# Patient Record
Sex: MALE | Race: WHITE | NOT HISPANIC OR LATINO
[De-identification: names, ages, dates, MRNs, and addresses within clinical notes are randomized per-mention and may not be internally consistent; named-entity substitution may affect disease eponyms.]

---

## 2019-10-08 PROBLEM — Z00.00 ENCOUNTER FOR PREVENTIVE HEALTH EXAMINATION: Status: ACTIVE | Noted: 2019-10-08

## 2019-11-07 ENCOUNTER — APPOINTMENT (OUTPATIENT)
Dept: SURGERY | Facility: CLINIC | Age: 54
End: 2019-11-07

## 2020-03-03 ENCOUNTER — APPOINTMENT (OUTPATIENT)
Dept: SURGERY | Facility: CLINIC | Age: 55
End: 2020-03-03

## 2020-06-18 ENCOUNTER — APPOINTMENT (OUTPATIENT)
Dept: SURGERY | Facility: CLINIC | Age: 55
End: 2020-06-18
Payer: COMMERCIAL

## 2020-06-18 VITALS — WEIGHT: 214 LBS | BODY MASS INDEX: 32.43 KG/M2 | HEIGHT: 68 IN

## 2020-06-18 PROCEDURE — 99243 OFF/OP CNSLTJ NEW/EST LOW 30: CPT

## 2020-06-18 NOTE — ASSESSMENT
[FreeTextEntry1] : Jt is a pleasant 55-year-old  presenting to the office with his wife with a past medical history significant for hypertension, hypercholesterolemia, GERD and who recently stopped drinking alcohol 3-1/2 weeks ago now complaining of pain and swelling in the mid abdomen suspicious for hernia.\par \par Physical examination demonstrates a large plum-sized bulge several fingerbreadths above the umbilicus which is tender to palpation and not reducible consistent with a large symptomatic incarcerated ventral hernia warranting surgical repair. This is complicated by a mild to moderate diastasis recti likely related to his excess abdominal weight. His current BMI is 33.\par \par I explained the pros and cons of surgery, as well as all risks, benefits, indications and alternatives of the procedure and the patient and his wife understood and agreed. Jt was scheduled for the repair of his incarcerated ventral hernia with mesh on Friday, July 24, 2020 under LOCAL with IV SEDATION at [facility] with presurgical testing waived.  He was encouraged to avoid heavy lifting and strenuous activity in the interim, of course.

## 2020-06-18 NOTE — CONSULT LETTER
[Dear  ___] : Dear  [unfilled], [Courtesy Letter:] : I had the pleasure of seeing your patient, [unfilled], in my office today. [Please see my note below.] : Please see my note below. [Consult Closing:] : Thank you very much for allowing me to participate in the care of this patient.  If you have any questions, please do not hesitate to contact me. [FreeTextEntry3] : Respectfully,\par \par Manuel Padilla M.D., FACS\par

## 2020-06-18 NOTE — PHYSICAL EXAM
[No Rash or Lesion] : No rash or lesion [Normal Rate and Rhythm] : normal rate and rhythm [Normal Breath Sounds] : Normal breath sounds [Calm] : calm [Alert] : alert [de-identified] : normal [JVD] : no jugular venous distention  [de-identified] : overweight [de-identified] : incarcerated ventral hernia [de-identified] : protuberant abdomen, moderate diastasis recti\par

## 2020-07-07 ENCOUNTER — OUTPATIENT (OUTPATIENT)
Dept: OUTPATIENT SERVICES | Facility: HOSPITAL | Age: 55
LOS: 1 days | Discharge: HOME | End: 2020-07-07

## 2020-07-07 ENCOUNTER — LABORATORY RESULT (OUTPATIENT)
Age: 55
End: 2020-07-07

## 2020-07-07 DIAGNOSIS — Z11.59 ENCOUNTER FOR SCREENING FOR OTHER VIRAL DISEASES: ICD-10-CM

## 2020-07-10 ENCOUNTER — APPOINTMENT (OUTPATIENT)
Dept: SURGERY | Facility: AMBULATORY SURGERY CENTER | Age: 55
End: 2020-07-10

## 2020-07-21 ENCOUNTER — APPOINTMENT (OUTPATIENT)
Dept: SURGERY | Facility: CLINIC | Age: 55
End: 2020-07-21

## 2020-08-07 ENCOUNTER — LABORATORY RESULT (OUTPATIENT)
Age: 55
End: 2020-08-07

## 2020-08-07 ENCOUNTER — OUTPATIENT (OUTPATIENT)
Dept: OUTPATIENT SERVICES | Facility: HOSPITAL | Age: 55
LOS: 1 days | Discharge: HOME | End: 2020-08-07

## 2020-08-07 DIAGNOSIS — Z11.59 ENCOUNTER FOR SCREENING FOR OTHER VIRAL DISEASES: ICD-10-CM

## 2020-08-07 NOTE — ASU PATIENT PROFILE, ADULT - NS TRANSFER EYEGLASSES PAIRS
----- Message from Marla Connell RN sent at 4/4/2019 11:50 AM CDT -----  GC/Chlam test results are negative. Will call pt to convey results.  
Contacted patient regarding clinic visit and culture results. Patient improved. Aware of results.   
Left message for pt to return phone call.    
1 pair

## 2020-08-10 ENCOUNTER — APPOINTMENT (OUTPATIENT)
Dept: SURGERY | Facility: AMBULATORY SURGERY CENTER | Age: 55
End: 2020-08-10
Payer: COMMERCIAL

## 2020-08-10 ENCOUNTER — OUTPATIENT (OUTPATIENT)
Dept: OUTPATIENT SERVICES | Facility: HOSPITAL | Age: 55
LOS: 1 days | Discharge: HOME | End: 2020-08-10

## 2020-08-10 VITALS
DIASTOLIC BLOOD PRESSURE: 81 MMHG | WEIGHT: 210.98 LBS | SYSTOLIC BLOOD PRESSURE: 129 MMHG | HEIGHT: 68 IN | RESPIRATION RATE: 20 BRPM | HEART RATE: 49 BPM | OXYGEN SATURATION: 98 % | TEMPERATURE: 98 F

## 2020-08-10 VITALS
HEART RATE: 55 BPM | OXYGEN SATURATION: 95 % | TEMPERATURE: 98 F | RESPIRATION RATE: 18 BRPM | SYSTOLIC BLOOD PRESSURE: 150 MMHG | DIASTOLIC BLOOD PRESSURE: 96 MMHG

## 2020-08-10 DIAGNOSIS — Z98.890 OTHER SPECIFIED POSTPROCEDURAL STATES: Chronic | ICD-10-CM

## 2020-08-10 PROCEDURE — 49561: CPT

## 2020-08-10 PROCEDURE — 49568: CPT

## 2020-08-10 PROCEDURE — 49585: CPT | Mod: XS

## 2020-08-10 RX ORDER — HYDROMORPHONE HYDROCHLORIDE 2 MG/ML
0.5 INJECTION INTRAMUSCULAR; INTRAVENOUS; SUBCUTANEOUS
Refills: 0 | Status: DISCONTINUED | OUTPATIENT
Start: 2020-08-10 | End: 2020-08-10

## 2020-08-10 RX ORDER — TRAMADOL HYDROCHLORIDE 50 MG/1
1 TABLET ORAL
Qty: 30 | Refills: 0
Start: 2020-08-10 | End: 2020-08-14

## 2020-08-10 RX ORDER — OXYCODONE AND ACETAMINOPHEN 5; 325 MG/1; MG/1
1 TABLET ORAL ONCE
Refills: 0 | Status: DISCONTINUED | OUTPATIENT
Start: 2020-08-10 | End: 2020-08-10

## 2020-08-10 RX ORDER — ONDANSETRON 8 MG/1
4 TABLET, FILM COATED ORAL ONCE
Refills: 0 | Status: DISCONTINUED | OUTPATIENT
Start: 2020-08-10 | End: 2020-08-25

## 2020-08-10 RX ORDER — SODIUM CHLORIDE 9 MG/ML
1000 INJECTION, SOLUTION INTRAVENOUS
Refills: 0 | Status: DISCONTINUED | OUTPATIENT
Start: 2020-08-10 | End: 2020-08-25

## 2020-08-10 RX ADMIN — OXYCODONE AND ACETAMINOPHEN 1 TABLET(S): 5; 325 TABLET ORAL at 10:30

## 2020-08-10 RX ADMIN — SODIUM CHLORIDE 100 MILLILITER(S): 9 INJECTION, SOLUTION INTRAVENOUS at 09:41

## 2020-08-10 NOTE — ASU DISCHARGE PLAN (ADULT/PEDIATRIC) - CARE PROVIDER_API CALL
Manuel Padilla  SURGERY  [address] [place] [address]  Phone: [phone]  Fax: [phone]  Scheduled Appointment: 08/18/2020

## 2020-08-10 NOTE — BRIEF OPERATIVE NOTE - NSICDXBRIEFPROCEDURE_GEN_ALL_CORE_FT
PROCEDURES:  Repair, hernia, umbilical, adult 10-Aug-2020 09:34:21  Manuel Padilla  Repair of incarcerated ventral hernia with mesh 10-Aug-2020 08:37:11  Manuel Padilla

## 2020-08-10 NOTE — BRIEF OPERATIVE NOTE - NSICDXBRIEFPOSTOP_GEN_ALL_CORE_FT
POST-OP DIAGNOSIS:  Umbilical hernia 10-Aug-2020 09:34:01  Manuel Padilla  Incarcerated ventral hernia 10-Aug-2020 08:37:04  Manuel Padilla

## 2020-08-10 NOTE — ASU PREOP CHECKLIST - VIA
ambulate I personally saw the patient with the PA, and completed the key components of the history and physical exam. I then discussed the management plan with the PA.   gen in nad resp clear cardiac no murmur abd soft neuro intact skin + tineal lesion as documented by pa no signs secondary infections  agree with pa plan of care

## 2020-08-10 NOTE — PRE-ANESTHESIA EVALUATION ADULT - NSANTHOSAYNRD_GEN_A_CORE
No. TARIQ screening performed.  STOP BANG Legend: 0-2 = LOW Risk; 3-4 = INTERMEDIATE Risk; 5-8 = HIGH Risk

## 2020-08-10 NOTE — CHART NOTE - NSCHARTNOTEFT_GEN_A_CORE
PACU ANESTHESIA ADMISSION NOTE      Procedure: Repair, hernia, umbilical, adult  Repair of incarcerated ventral hernia with mesh    Post op diagnosis:  Umbilical hernia  Incarcerated ventral hernia      ____  Intubated  TV:______       Rate: ______      FiO2: ______    _x___  Patent Airway    _x___  Full return of protective reflexes    _x___  Full recovery from anesthesia / back to baseline status    Vitals  SPO2:-99% on 3l nc  HR:-79  RR:-12  B.P:-133/83  TEMP:-97.7    Mental Status:  _x___ Awake   ___x_ Alert   _____ Drowsy   _____ Sedated    Nausea/Vomiting:  _x___  NO       ______Yes,   See Post - Op Orders         Pain Scale (0-10):  __0___    Treatment: _x___ None    __x__ See Post - Op/PCA Orders    Post - Operative Fluids:   ___ Oral   ____x See Post - Op Orders    Plan: Discharge:   __x__Home       _____Floor     _____Critical Care    _____  Other:_________________    Comments:  Report endorsed to RN in pacu  Vitals stable  No anesthesia issues or complications noted.  Discharge to patient to home when criteria met.

## 2020-08-13 DIAGNOSIS — F41.9 ANXIETY DISORDER, UNSPECIFIED: ICD-10-CM

## 2020-08-13 DIAGNOSIS — K43.6 OTHER AND UNSPECIFIED VENTRAL HERNIA WITH OBSTRUCTION, WITHOUT GANGRENE: ICD-10-CM

## 2020-08-13 DIAGNOSIS — I10 ESSENTIAL (PRIMARY) HYPERTENSION: ICD-10-CM

## 2020-08-13 DIAGNOSIS — F32.9 MAJOR DEPRESSIVE DISORDER, SINGLE EPISODE, UNSPECIFIED: ICD-10-CM

## 2020-08-13 DIAGNOSIS — K42.9 UMBILICAL HERNIA WITHOUT OBSTRUCTION OR GANGRENE: ICD-10-CM

## 2020-08-20 ENCOUNTER — APPOINTMENT (OUTPATIENT)
Dept: SURGERY | Facility: CLINIC | Age: 55
End: 2020-08-20
Payer: COMMERCIAL

## 2020-08-20 DIAGNOSIS — E66.09 OTHER OBESITY DUE TO EXCESS CALORIES: ICD-10-CM

## 2020-08-20 DIAGNOSIS — M62.08 SEPARATION OF MUSCLE (NONTRAUMATIC), OTHER SITE: ICD-10-CM

## 2020-08-20 DIAGNOSIS — K43.6 OTHER AND UNSPECIFIED VENTRAL HERNIA WITH OBSTRUCTION, W/OUT GANGRENE: ICD-10-CM

## 2020-08-20 PROCEDURE — 99024 POSTOP FOLLOW-UP VISIT: CPT

## 2020-08-20 NOTE — ASSESSMENT
[FreeTextEntry1] : Jt underwent the repair of his incarcerated ventral hernia with mesh and repair of his umbilical hernia on August 10, 2020 under local with IV sedation without any problems or complications. His wound is clean, dry and intact. There is no evidence of erythema, seroma formation or infection. He is tolerating a diet and having normal bowel movements. He denies any significant postoperative pain or discomfort at this time.\par \par He was counseled and reassured. Jt was discharged from the office with no specific followup necessary, but he knows to avoid any heavy lifting or strenuous activity for the next several weeks. We also discussed the importance of calorie restriction and healthy eating with regard to weight loss, hernia recurrence and his overall health. He was encouraged to continue to wear his abdominal binder for the better part of the next month, especially since he stopped wearing it on his own several days ago.

## 2020-08-20 NOTE — CONSULT LETTER
[FreeTextEntry1] : Dear Dr. Tamir Avalos, \par \par I had the pleasure of seeing your patient, ARTHUR FLORES, in my office today. Please see my note below. \par \par Thank you very much for allowing me to participate in the care of this patient. If you have any questions, please do not hesitate to contact me. \par \par \par Respectfully,\par \par Manuel Padilla M.D., FACS

## 2021-09-12 RX ADMIN — MORPHINE SULFATE 2 MILLIGRAM(S): 50 CAPSULE, EXTENDED RELEASE ORAL at 12:13

## 2021-09-13 ENCOUNTER — INPATIENT (INPATIENT)
Facility: HOSPITAL | Age: 56
LOS: 2 days | Discharge: HOME | End: 2021-09-16
Attending: HOSPITALIST | Admitting: HOSPITALIST
Payer: COMMERCIAL

## 2021-09-13 VITALS
DIASTOLIC BLOOD PRESSURE: 88 MMHG | HEART RATE: 65 BPM | OXYGEN SATURATION: 95 % | SYSTOLIC BLOOD PRESSURE: 145 MMHG | RESPIRATION RATE: 20 BRPM | HEIGHT: 68 IN | TEMPERATURE: 97 F | WEIGHT: 212.08 LBS

## 2021-09-13 DIAGNOSIS — E78.5 HYPERLIPIDEMIA, UNSPECIFIED: ICD-10-CM

## 2021-09-13 DIAGNOSIS — Z98.890 OTHER SPECIFIED POSTPROCEDURAL STATES: Chronic | ICD-10-CM

## 2021-09-13 DIAGNOSIS — K21.9 GASTRO-ESOPHAGEAL REFLUX DISEASE WITHOUT ESOPHAGITIS: ICD-10-CM

## 2021-09-13 DIAGNOSIS — K85.90 ACUTE PANCREATITIS WITHOUT NECROSIS OR INFECTION, UNSPECIFIED: ICD-10-CM

## 2021-09-13 DIAGNOSIS — I10 ESSENTIAL (PRIMARY) HYPERTENSION: ICD-10-CM

## 2021-09-13 PROBLEM — F10.20 ALCOHOL DEPENDENCE, UNCOMPLICATED: Chronic | Status: ACTIVE | Noted: 2020-08-10

## 2021-09-13 LAB
ALBUMIN SERPL ELPH-MCNC: 4.7 G/DL — SIGNIFICANT CHANGE UP (ref 3.5–5.2)
ALP SERPL-CCNC: 109 U/L — SIGNIFICANT CHANGE UP (ref 30–115)
ALT FLD-CCNC: 30 U/L — SIGNIFICANT CHANGE UP (ref 0–41)
ANION GAP SERPL CALC-SCNC: 12 MMOL/L — SIGNIFICANT CHANGE UP (ref 7–14)
AST SERPL-CCNC: 27 U/L — SIGNIFICANT CHANGE UP (ref 0–41)
BILIRUB SERPL-MCNC: 1.6 MG/DL — HIGH (ref 0.2–1.2)
BUN SERPL-MCNC: 18 MG/DL — SIGNIFICANT CHANGE UP (ref 10–20)
CALCIUM SERPL-MCNC: 9.8 MG/DL — SIGNIFICANT CHANGE UP (ref 8.5–10.1)
CHLORIDE SERPL-SCNC: 102 MMOL/L — SIGNIFICANT CHANGE UP (ref 98–110)
CO2 SERPL-SCNC: 24 MMOL/L — SIGNIFICANT CHANGE UP (ref 17–32)
CREAT SERPL-MCNC: 1.2 MG/DL — SIGNIFICANT CHANGE UP (ref 0.7–1.5)
GLUCOSE SERPL-MCNC: 112 MG/DL — HIGH (ref 70–99)
HCT VFR BLD CALC: 43.4 % — SIGNIFICANT CHANGE UP (ref 42–52)
HGB BLD-MCNC: 14.9 G/DL — SIGNIFICANT CHANGE UP (ref 14–18)
LIDOCAIN IGE QN: 141 U/L — HIGH (ref 7–60)
MCHC RBC-ENTMCNC: 30.4 PG — SIGNIFICANT CHANGE UP (ref 27–31)
MCHC RBC-ENTMCNC: 34.3 G/DL — SIGNIFICANT CHANGE UP (ref 32–37)
MCV RBC AUTO: 88.6 FL — SIGNIFICANT CHANGE UP (ref 80–94)
NRBC # BLD: 0 /100 WBCS — SIGNIFICANT CHANGE UP (ref 0–0)
PLATELET # BLD AUTO: 186 K/UL — SIGNIFICANT CHANGE UP (ref 130–400)
POTASSIUM SERPL-MCNC: 4.2 MMOL/L — SIGNIFICANT CHANGE UP (ref 3.5–5)
POTASSIUM SERPL-SCNC: 4.2 MMOL/L — SIGNIFICANT CHANGE UP (ref 3.5–5)
PROT SERPL-MCNC: 6.7 G/DL — SIGNIFICANT CHANGE UP (ref 6–8)
RBC # BLD: 4.9 M/UL — SIGNIFICANT CHANGE UP (ref 4.7–6.1)
RBC # FLD: 12.5 % — SIGNIFICANT CHANGE UP (ref 11.5–14.5)
SARS-COV-2 RNA SPEC QL NAA+PROBE: SIGNIFICANT CHANGE UP
SODIUM SERPL-SCNC: 138 MMOL/L — SIGNIFICANT CHANGE UP (ref 135–146)
WBC # BLD: 10.23 K/UL — SIGNIFICANT CHANGE UP (ref 4.8–10.8)
WBC # FLD AUTO: 10.23 K/UL — SIGNIFICANT CHANGE UP (ref 4.8–10.8)

## 2021-09-13 PROCEDURE — 93010 ELECTROCARDIOGRAM REPORT: CPT

## 2021-09-13 PROCEDURE — 74177 CT ABD & PELVIS W/CONTRAST: CPT | Mod: 26,MA

## 2021-09-13 PROCEDURE — 99285 EMERGENCY DEPT VISIT HI MDM: CPT

## 2021-09-13 PROCEDURE — 76705 ECHO EXAM OF ABDOMEN: CPT | Mod: 26

## 2021-09-13 PROCEDURE — 71045 X-RAY EXAM CHEST 1 VIEW: CPT | Mod: 26

## 2021-09-13 RX ORDER — OMEGA-3 ACID ETHYL ESTERS 1 G
0 CAPSULE ORAL
Qty: 0 | Refills: 0 | DISCHARGE

## 2021-09-13 RX ORDER — DEXLANSOPRAZOLE 30 MG/1
1 CAPSULE, DELAYED RELEASE ORAL
Qty: 0 | Refills: 0 | DISCHARGE

## 2021-09-13 RX ORDER — MORPHINE SULFATE 50 MG/1
4 CAPSULE, EXTENDED RELEASE ORAL EVERY 4 HOURS
Refills: 0 | Status: DISCONTINUED | OUTPATIENT
Start: 2021-09-13 | End: 2021-09-13

## 2021-09-13 RX ORDER — PANTOPRAZOLE SODIUM 20 MG/1
40 TABLET, DELAYED RELEASE ORAL DAILY
Refills: 0 | Status: DISCONTINUED | OUTPATIENT
Start: 2021-09-13 | End: 2021-09-15

## 2021-09-13 RX ORDER — ATORVASTATIN CALCIUM 80 MG/1
40 TABLET, FILM COATED ORAL DAILY
Refills: 0 | Status: DISCONTINUED | OUTPATIENT
Start: 2021-09-14 | End: 2021-09-16

## 2021-09-13 RX ORDER — MORPHINE SULFATE 50 MG/1
4 CAPSULE, EXTENDED RELEASE ORAL ONCE
Refills: 0 | Status: DISCONTINUED | OUTPATIENT
Start: 2021-09-13 | End: 2021-09-13

## 2021-09-13 RX ORDER — FAMOTIDINE 10 MG/ML
20 INJECTION INTRAVENOUS ONCE
Refills: 0 | Status: COMPLETED | OUTPATIENT
Start: 2021-09-13 | End: 2021-09-13

## 2021-09-13 RX ORDER — MORPHINE SULFATE 50 MG/1
2 CAPSULE, EXTENDED RELEASE ORAL EVERY 4 HOURS
Refills: 0 | Status: DISCONTINUED | OUTPATIENT
Start: 2021-09-13 | End: 2021-09-15

## 2021-09-13 RX ORDER — ACETAMINOPHEN 500 MG
650 TABLET ORAL EVERY 8 HOURS
Refills: 0 | Status: DISCONTINUED | OUTPATIENT
Start: 2021-09-13 | End: 2021-09-16

## 2021-09-13 RX ORDER — ONDANSETRON 8 MG/1
4 TABLET, FILM COATED ORAL EVERY 4 HOURS
Refills: 0 | Status: DISCONTINUED | OUTPATIENT
Start: 2021-09-13 | End: 2021-09-16

## 2021-09-13 RX ORDER — CHLORHEXIDINE GLUCONATE 213 G/1000ML
1 SOLUTION TOPICAL
Refills: 0 | Status: DISCONTINUED | OUTPATIENT
Start: 2021-09-13 | End: 2021-09-16

## 2021-09-13 RX ORDER — SODIUM CHLORIDE 9 MG/ML
1000 INJECTION INTRAMUSCULAR; INTRAVENOUS; SUBCUTANEOUS
Refills: 0 | Status: DISCONTINUED | OUTPATIENT
Start: 2021-09-13 | End: 2021-09-15

## 2021-09-13 RX ORDER — ENOXAPARIN SODIUM 100 MG/ML
40 INJECTION SUBCUTANEOUS DAILY
Refills: 0 | Status: DISCONTINUED | OUTPATIENT
Start: 2021-09-13 | End: 2021-09-16

## 2021-09-13 RX ORDER — ROSUVASTATIN CALCIUM 5 MG/1
1 TABLET ORAL
Qty: 0 | Refills: 0 | DISCHARGE

## 2021-09-13 RX ORDER — SODIUM CHLORIDE 9 MG/ML
3000 INJECTION, SOLUTION INTRAVENOUS
Refills: 0 | Status: DISCONTINUED | OUTPATIENT
Start: 2021-09-13 | End: 2021-09-13

## 2021-09-13 RX ADMIN — PANTOPRAZOLE SODIUM 40 MILLIGRAM(S): 20 TABLET, DELAYED RELEASE ORAL at 18:19

## 2021-09-13 RX ADMIN — ONDANSETRON 4 MILLIGRAM(S): 8 TABLET, FILM COATED ORAL at 17:02

## 2021-09-13 RX ADMIN — Medication 30 MILLILITER(S): at 08:47

## 2021-09-13 RX ADMIN — Medication 650 MILLIGRAM(S): at 17:53

## 2021-09-13 RX ADMIN — SODIUM CHLORIDE 200 MILLILITER(S): 9 INJECTION INTRAMUSCULAR; INTRAVENOUS; SUBCUTANEOUS at 21:35

## 2021-09-13 RX ADMIN — MORPHINE SULFATE 4 MILLIGRAM(S): 50 CAPSULE, EXTENDED RELEASE ORAL at 12:26

## 2021-09-13 RX ADMIN — SODIUM CHLORIDE 1000 MILLILITER(S): 9 INJECTION, SOLUTION INTRAVENOUS at 12:26

## 2021-09-13 RX ADMIN — MORPHINE SULFATE 4 MILLIGRAM(S): 50 CAPSULE, EXTENDED RELEASE ORAL at 14:52

## 2021-09-13 RX ADMIN — Medication 650 MILLIGRAM(S): at 18:19

## 2021-09-13 RX ADMIN — FAMOTIDINE 20 MILLIGRAM(S): 10 INJECTION INTRAVENOUS at 08:47

## 2021-09-13 NOTE — H&P ADULT - ATTENDING COMMENTS
Vital Signs Last 24 Hrs  T(C): 36.8 (13 Sep 2021 15:44), Max: 36.8 (13 Sep 2021 15:44)  T(F): 98.2 (13 Sep 2021 15:44), Max: 98.2 (13 Sep 2021 15:44)  HR: 57 (13 Sep 2021 15:44) (55 - 65)  BP: 178/97 (13 Sep 2021 15:44) (145/88 - 178/97)  BP(mean): --  RR: 18 (13 Sep 2021 15:44) (18 - 20)  SpO2: 97% (13 Sep 2021 14:43) (95% - 97%)  Pe  nad  aaox3  [id number]  ctabl  sofnd+bs mild epigastric tenderness - no rebound/guarding  nocce  obese    #acute pancreatitis - first episode  no gallstones  pt states he used to be a heavy binge drinker over a year ago  2 days prior to presentation had 2 beers at home  possibly ETOH induced pancreatitis   check triglyceride level  possibly medication induced - on ACEI and statin  will keep for now  IVFS  pain control   npo   monitor for clinical decompnesation - if vitals unstable call pulmonary /critical care    discussed with pt and PA

## 2021-09-13 NOTE — ED PROVIDER NOTE - NS ED ROS FT
Review of Systems:  •	CONSTITUTIONAL - No fever, No diaphoresis, No weight change  •	SKIN - No rash  •	HEMATOLOGIC - No abnormal bleeding or bruising  •	EYES - No eye pain, No blurred vision  •	ENT - No change in hearing, No sore throat, No neck pain, No rhinorrhea, No ear pain  •	RESPIRATORY - No shortness of breath, No cough  •	CARDIAC -No chest pain, No palpitations  •	GI - + abdominal pain, No nausea, No vomiting, No diarrhea, No constipation, No bright red blood per rectum or melena. No flank pain  •                 - No dysuria, frequency, hematuria.   •	ENDO - No polydypsia, No polyuria, No heat/cold intolerance  •	MUSCULOSKELETAL - No joint paint, No swelling, No back pain  •	NEUROLOGIC - No numbness, No focal weakness, No headache, No dizziness  All other systems negative, unless specified in HPI

## 2021-09-13 NOTE — ED PROVIDER NOTE - CLINICAL SUMMARY MEDICAL DECISION MAKING FREE TEXT BOX
56 yr old m that presents with abd pain   -labs  -ivf  -pain management  -imaging concerning for pancreatitis. will admit to medicine for further evaluation.

## 2021-09-13 NOTE — H&P ADULT - HISTORY OF PRESENT ILLNESS
The patient is a 55 y/o male with PMH of hld, htn, acid reflux, alcoholism, and erosive gastritis, presenting with epigastric abdominal pain for 3 days. He state the pain began while eating at a  BB[employer] and drinking 2 beers. Patient describes the pain as being located in epigastric region, sharp, constant, and without radiation. Denies  aggravating factors. He states he has never had pancreatitis before. Patient states he used to be a binge drinker, drinking until blackout and cut down 1 year ago to drinking only 2-3 drinks few times a month. Patient follows with his GI doctor in [facility]. His last endoscopy was 1 year ago and was diagnosed with erosive gastritis. Denies fever, chills, n/v, diarrhea. States he had a formed bm last night.

## 2021-09-13 NOTE — ED PROVIDER NOTE - ATTENDING CONTRIBUTION TO CARE
56 yr old m w/ a pmh significant for gastritis, s/p hernia repair who presents for abd pain. Pt states that for the past couple of days he has been having diffuse abd pain. Pt denies any chest pain, sob, nausea, vomiting, diarrhea or any other complaints.     VITAL SIGNS: I have reviewed nursing notes and confirm.  CONSTITUTIONAL: non-toxic, well appearing  SKIN: no rash, no petechiae.  EYES: EOMI, pink conjunctiva, anicteric  ENT: tongue midline, no exudates, MMM  NECK: Supple; no meningismus, no JVD  CARD: RRR, no murmurs, equal radial pulses bilaterally 2+  RESP: CTAB, no respiratory distress  ABD: Soft, diffuse tenderness, non-distended, no peritoneal signs, no HSM, no CVA tenderness  EXT: Normal ROM x4. No edema. No calves tenderness  NEURO: Alert, oriented. CN2-12 intact, equal strength bilaterally, nl gait.  PSYCH: Cooperative, appropriate.    a/p  56 yr old m that presents with abd pain   -labs  -ivf  -pain management  -imaging  -reassess  -dispo pending

## 2021-09-13 NOTE — ED PROVIDER NOTE - OBJECTIVE STATEMENT
Patient is a 55 yo male with PMHx of GERD, gastritis, HLD, HTN, alcohol abuse c/o abdominal pain x 3 days. Patient was at [facility] and started with gradual onset of sharp, constant, moderate to severe, epigastric abdominal pain. Never had this before. Denies fever, chills, chest pain, SOB, cough, n/v/d. Used to drink more alcohol before, but had cut down over past year. Last endoscopy 1 year ago, saw gastritis.

## 2021-09-13 NOTE — H&P ADULT - NSHPSOCIALHISTORY_GEN_ALL_CORE
tobacco: quit 20 years ago. 1 pack a day for 6 years.   alcohol: previously a binge drinker, drinking until blackout. cut down 1 year ago and now drinks occasionally (few drinks, 2-3x month)  drugs: none

## 2021-09-13 NOTE — ED PROVIDER NOTE - NS ED MD DISPO SPECIAL CONSIDERATION1
What Type Of Note Output Would You Prefer (Optional)?: Standard Output Hpi Title: Evaluation of Skin Lesions Have Your Spot(S) Been Treated In The Past?: has not been treated None

## 2021-09-13 NOTE — H&P ADULT - NSICDXPASTMEDICALHX_GEN_ALL_CORE_FT
PAST MEDICAL HISTORY:  Acid reflux disease with ulcer     Alcoholism     Gastritis     Hyperlipemia     Hypertension

## 2021-09-13 NOTE — H&P ADULT - ASSESSMENT
The patient is a 55 y/o male with PMH of hld, htn, acid reflux, alcoholism, and erosive gastritis, presenting with acute pancreatitis.

## 2021-09-13 NOTE — H&P ADULT - NSHPPHYSICALEXAM_GEN_ALL_CORE
ICU Vital Signs Last 24 Hrs  T(C): 36.2 (13 Sep 2021 08:20), Max: 36.2 (13 Sep 2021 08:20)  T(F): 97.2 (13 Sep 2021 08:20), Max: 97.2 (13 Sep 2021 08:20)  HR: 55 (13 Sep 2021 10:07) (55 - 65)  BP: 165/90 (13 Sep 2021 10:07) (145/88 - 165/90)  RR: 18 (13 Sep 2021 10:07) (18 - 20)  SpO2: 96% (13 Sep 2021 10:07) (95% - 96%)    PHYSICAL EXAM:  Constitutional: NAD, A&O x3  Respiratory: +air entry, no rales, no rhonchi, no wheezes  Cardiovascular: +S1 and S2, regular rate and rhythm  Gastrointestinal: +BS, soft, minimal tenderness, not distended. no guarding   Extremities:  no edema, no calf tenderness  Vascular: +dorsal pedis and radial pulses, no extremity cyanosis  Skin: no rashes, normal turgor

## 2021-09-13 NOTE — H&P ADULT - NSICDXPASTSURGICALHX_GEN_ALL_CORE_FT
PAST SURGICAL HISTORY:  H/O shoulder surgery Rotator cuff repair    History of hernia repair abdominal wall

## 2021-09-13 NOTE — ED PROVIDER NOTE - PHYSICAL EXAMINATION
CONSTITUTIONAL: Well-developed; well-nourished; in no acute distress.   SKIN: warm, dry  HEAD: Normocephalic; atraumatic.  EYES: no conjunctival injection. PERRL.   ENT: No nasal discharge; airway clear.  NECK: Supple; non tender.  CARD: S1, S2 normal; no murmurs, gallops, or rubs. Regular rate and rhythm.   RESP: No wheezes, rales or rhonchi.  ABD: soft, tender in epigastric area, no rebound tenderness or guarding, no CVAT b/l  EXT: Normal ROM.  No clubbing, cyanosis or edema.   LYMPH: No acute cervical adenopathy.  NEURO: Alert, oriented, grossly unremarkable  PSYCH: Cooperative, appropriate.

## 2021-09-13 NOTE — ED ADULT NURSE NOTE - NSIMPLEMENTINTERV_GEN_ALL_ED
Implemented All Universal Safety Interventions:  [place] to call system. Call bell, personal items and telephone within reach. Instruct patient to call for assistance. Room bathroom lighting operational. Non-slip footwear when patient is off stretcher. Physically safe environment: no spills, clutter or unnecessary equipment. Stretcher in lowest position, wheels locked, appropriate side rails in place.

## 2021-09-13 NOTE — H&P ADULT - NSICDXFAMILYHX_GEN_ALL_CORE_FT
FAMILY HISTORY:  Father  Still living? No  FH: alcoholism, Age at diagnosis: Age Unknown    Mother  Still living? No  FH: breast cancer, Age at diagnosis: Age Unknown

## 2021-09-13 NOTE — H&P ADULT - NSHPLABSRESULTS_GEN_ALL_CORE
14.9   10.23 )-----------( 186      ( 13 Sep 2021 09:00 )             43.4       09-13    138  |  102  |  18  ----------------------------<  112<H>  4.2   |  24  |  1.2    Ca    9.8      13 Sep 2021 09:00    TPro  6.7  /  Alb  4.7  /  TBili  1.6<H>  /  DBili  x   /  AST  27  /  ALT  30  /  AlkPhos  109  09-13    lipase: 141    CT Abdomen and Pelvis w/ IV Cont (09.13.21 @ 11:26) >    IMPRESSION:    Peripancreatic inflammation surrounding pancreatic head/neck and extending over duodenum. Findings most consistent with pancreatitis and reactive duodenitis.    No drainable fluid collection.    --- End of Report ---        US Abdomen Upper Quadrant Right (09.13.21 @ 09:14) >    IMPRESSION:    Unremarkable sonographic exam.    --- End of Report ---    < end of copied text >

## 2021-09-14 LAB
ALBUMIN SERPL ELPH-MCNC: 4.1 G/DL — SIGNIFICANT CHANGE UP (ref 3.5–5.2)
ALP SERPL-CCNC: 100 U/L — SIGNIFICANT CHANGE UP (ref 30–115)
ALT FLD-CCNC: 20 U/L — SIGNIFICANT CHANGE UP (ref 0–41)
ANION GAP SERPL CALC-SCNC: 13 MMOL/L — SIGNIFICANT CHANGE UP (ref 7–14)
AST SERPL-CCNC: 20 U/L — SIGNIFICANT CHANGE UP (ref 0–41)
BILIRUB SERPL-MCNC: 1.9 MG/DL — HIGH (ref 0.2–1.2)
BUN SERPL-MCNC: 9 MG/DL — LOW (ref 10–20)
CALCIUM SERPL-MCNC: 8.9 MG/DL — SIGNIFICANT CHANGE UP (ref 8.5–10.1)
CHLORIDE SERPL-SCNC: 103 MMOL/L — SIGNIFICANT CHANGE UP (ref 98–110)
CHOLEST SERPL-MCNC: 113 MG/DL — SIGNIFICANT CHANGE UP
CO2 SERPL-SCNC: 21 MMOL/L — SIGNIFICANT CHANGE UP (ref 17–32)
CREAT SERPL-MCNC: 0.9 MG/DL — SIGNIFICANT CHANGE UP (ref 0.7–1.5)
GLUCOSE SERPL-MCNC: 108 MG/DL — HIGH (ref 70–99)
HCT VFR BLD CALC: 41.9 % — LOW (ref 42–52)
HCV AB S/CO SERPL IA: 0.04 COI — SIGNIFICANT CHANGE UP
HCV AB SERPL-IMP: SIGNIFICANT CHANGE UP
HDLC SERPL-MCNC: 52 MG/DL — SIGNIFICANT CHANGE UP
HGB BLD-MCNC: 14.5 G/DL — SIGNIFICANT CHANGE UP (ref 14–18)
LIDOCAIN IGE QN: 44 U/L — SIGNIFICANT CHANGE UP (ref 7–60)
LIPID PNL WITH DIRECT LDL SERPL: 53 MG/DL — SIGNIFICANT CHANGE UP
MAGNESIUM SERPL-MCNC: 1.9 MG/DL — SIGNIFICANT CHANGE UP (ref 1.8–2.4)
MCHC RBC-ENTMCNC: 30.4 PG — SIGNIFICANT CHANGE UP (ref 27–31)
MCHC RBC-ENTMCNC: 34.6 G/DL — SIGNIFICANT CHANGE UP (ref 32–37)
MCV RBC AUTO: 87.8 FL — SIGNIFICANT CHANGE UP (ref 80–94)
NON HDL CHOLESTEROL: 61 MG/DL — SIGNIFICANT CHANGE UP
NRBC # BLD: 0 /100 WBCS — SIGNIFICANT CHANGE UP (ref 0–0)
PLATELET # BLD AUTO: 154 K/UL — SIGNIFICANT CHANGE UP (ref 130–400)
POTASSIUM SERPL-MCNC: 4.1 MMOL/L — SIGNIFICANT CHANGE UP (ref 3.5–5)
POTASSIUM SERPL-SCNC: 4.1 MMOL/L — SIGNIFICANT CHANGE UP (ref 3.5–5)
PROT SERPL-MCNC: 6.2 G/DL — SIGNIFICANT CHANGE UP (ref 6–8)
RBC # BLD: 4.77 M/UL — SIGNIFICANT CHANGE UP (ref 4.7–6.1)
RBC # FLD: 12.4 % — SIGNIFICANT CHANGE UP (ref 11.5–14.5)
SODIUM SERPL-SCNC: 137 MMOL/L — SIGNIFICANT CHANGE UP (ref 135–146)
TRIGL SERPL-MCNC: 83 MG/DL — SIGNIFICANT CHANGE UP
WBC # BLD: 14.54 K/UL — HIGH (ref 4.8–10.8)
WBC # FLD AUTO: 14.54 K/UL — HIGH (ref 4.8–10.8)

## 2021-09-14 PROCEDURE — 99231 SBSQ HOSP IP/OBS SF/LOW 25: CPT

## 2021-09-14 RX ADMIN — MORPHINE SULFATE 2 MILLIGRAM(S): 50 CAPSULE, EXTENDED RELEASE ORAL at 18:05

## 2021-09-14 RX ADMIN — ATORVASTATIN CALCIUM 40 MILLIGRAM(S): 80 TABLET, FILM COATED ORAL at 15:40

## 2021-09-14 RX ADMIN — MORPHINE SULFATE 2 MILLIGRAM(S): 50 CAPSULE, EXTENDED RELEASE ORAL at 17:50

## 2021-09-14 RX ADMIN — Medication 10 MILLIGRAM(S): at 06:50

## 2021-09-14 RX ADMIN — Medication 30 MILLILITER(S): at 07:17

## 2021-09-14 RX ADMIN — MORPHINE SULFATE 2 MILLIGRAM(S): 50 CAPSULE, EXTENDED RELEASE ORAL at 11:58

## 2021-09-14 RX ADMIN — SODIUM CHLORIDE 200 MILLILITER(S): 9 INJECTION INTRAMUSCULAR; INTRAVENOUS; SUBCUTANEOUS at 18:54

## 2021-09-14 RX ADMIN — MORPHINE SULFATE 2 MILLIGRAM(S): 50 CAPSULE, EXTENDED RELEASE ORAL at 22:15

## 2021-09-14 RX ADMIN — ENOXAPARIN SODIUM 40 MILLIGRAM(S): 100 INJECTION SUBCUTANEOUS at 12:02

## 2021-09-14 RX ADMIN — PANTOPRAZOLE SODIUM 40 MILLIGRAM(S): 20 TABLET, DELAYED RELEASE ORAL at 07:25

## 2021-09-14 RX ADMIN — MORPHINE SULFATE 2 MILLIGRAM(S): 50 CAPSULE, EXTENDED RELEASE ORAL at 07:27

## 2021-09-14 RX ADMIN — ONDANSETRON 4 MILLIGRAM(S): 8 TABLET, FILM COATED ORAL at 12:03

## 2021-09-14 RX ADMIN — MORPHINE SULFATE 2 MILLIGRAM(S): 50 CAPSULE, EXTENDED RELEASE ORAL at 07:42

## 2021-09-14 RX ADMIN — Medication 650 MILLIGRAM(S): at 02:33

## 2021-09-14 RX ADMIN — SODIUM CHLORIDE 200 MILLILITER(S): 9 INJECTION INTRAMUSCULAR; INTRAVENOUS; SUBCUTANEOUS at 02:34

## 2021-09-14 RX ADMIN — MORPHINE SULFATE 2 MILLIGRAM(S): 50 CAPSULE, EXTENDED RELEASE ORAL at 21:56

## 2021-09-14 RX ADMIN — SODIUM CHLORIDE 200 MILLILITER(S): 9 INJECTION INTRAMUSCULAR; INTRAVENOUS; SUBCUTANEOUS at 13:37

## 2021-09-14 RX ADMIN — SODIUM CHLORIDE 200 MILLILITER(S): 9 INJECTION INTRAMUSCULAR; INTRAVENOUS; SUBCUTANEOUS at 07:17

## 2021-09-14 NOTE — PROGRESS NOTE ADULT - SUBJECTIVE AND OBJECTIVE BOX
ARTHUR FLORES  56y, Male  Allergy: No Known Allergies      CHIEF COMPLAINT: pancreatitis (13 Sep 2021 13:38)      HPI:                         The patient is a 55 y/o male with PMH of hld, htn, acid reflux, alcoholism, and erosive gastritis, presenting with epigastric abdominal pain for 3 days. He state the pain began while eating at a  BBQ and drinking 2 beers. Patient describes the pain as being located in epigastric region, sharp, constant, and without radiation. Denies  aggravating factors. He states he has never had pancreatitis before. Patient states he used to be a binge drinker, drinking until blackout and cut down 1 year ago to drinking only 2-3 drinks few times a month. Patient follows with his GI doctor in [facility]. His last endoscopy was 1 year ago and was diagnosed with erosive gastritis. Denies fever, chills, n/v, diarrhea. States he had a formed bm last night.  (13 Sep 2021 13:38)    HPI:    FAMILY HISTORY:  FH: alcoholism (Father)    FH: breast cancer (Mother)      PAST MEDICAL & SURGICAL HISTORY:  Hypertension    Alcoholism    Acid reflux disease with ulcer    Hyperlipemia    Gastritis    H/O shoulder surgery  Rotator cuff repair    History of hernia repair  abdominal wall        SOCIAL HISTORY  Social History:  tobacco: quit 20 years ago. 1 pack a day for 6 years.   alcohol: previously a binge drinker, drinking until blackout. cut down 1 year ago and now drinks occasionally (few drinks, 2-3x month)  drugs: none   (13 Sep 2021 13:38)        ROS  General: Denies fevers, chills, nightsweats, weight loss  HEENT: Denies headache, rhinorrhea, sore throat, eye pain  CV: Denies CP, palpitations  PULM: Denies SOB, cough  GI: +abdpain, nausea   : Denies dysuria, hematuria  MSK: Denies arthralgias  SKIN: Denies rash   NEURO: Denies paresthesias, weakness  PSYCH: Denies depression    VITALS:  T(F): 98.2, Max: 99 (09-13-21 @ 20:48)  HR: 55  BP: 153/86  RR: 16Vital Signs Last 24 Hrs  T(C): 36.8 (14 Sep 2021 06:13), Max: 37.2 (13 Sep 2021 20:48)  T(F): 98.2 (14 Sep 2021 06:13), Max: 99 (13 Sep 2021 20:48)  HR: 55 (14 Sep 2021 06:13) (54 - 57)  BP: 153/86 (14 Sep 2021 06:13) (137/91 - 178/97)  BP(mean): --  RR: 16 (14 Sep 2021 06:13) (16 - 18)  SpO2: 97% (13 Sep 2021 14:43) (97% - 97%)    PHYSICAL EXAM:  Gen: NAD, resting in bed. obese   HEENT: Normocephalic, atraumatic  Neck: supple, no lymphadenopathy  CV: Regular rate & regular rhythm  Lungs: decreased BS at bases, no fremitus  Abdomen: Soft, BS present +epigastric tenderness - no rebound or guarding   Ext: Warm, well perfused  Neuro: non focal, awake  Skin: no rash, no erythema  Psych: no SI, HI, Hallucination     TESTS & MEASUREMENTS:                        14.9   10.23 )-----------( 186      ( 13 Sep 2021 09:00 )             43.4     09-13    138  |  102  |  18  ----------------------------<  112<H>  4.2   |  24  |  1.2    Ca    9.8      13 Sep 2021 09:00    TPro  6.7  /  Alb  4.7  /  TBili  1.6<H>  /  DBili  x   /  AST  27  /  ALT  30  /  AlkPhos  109  09-13      LIVER FUNCTIONS - ( 13 Sep 2021 09:00 )  Alb: 4.7 g/dL / Pro: 6.7 g/dL / ALK PHOS: 109 U/L / ALT: 30 U/L / AST: 27 U/L / GGT: x               ]      INFECTIOUS DISEASES TESTING      RADIOLOGY & ADDITIONAL TESTS:  I have personally reviewed the last Chest xray  CXR      CT  CT Abdomen and Pelvis w/ IV Cont:   EXAM:  CT ABDOMEN AND PELVIS IC            PROCEDURE DATE:  09/13/2021            INTERPRETATION:  CLINICAL STATEMENT: Abdominal pain    TECHNIQUE: Contiguous axial CT images were obtained from the lower chest to the pubic symphysis following administration of 100cc Optiray 320 intravenous contrast.  Oral contrast was not administered.  Reformatted images in the coronal and sagittal planes were acquired.    COMPARISON CT: None.    OTHER STUDIES USED FOR CORRELATION: None.      FINDINGS:    LOWER CHEST: Unremarkable.    HEPATOBILIARY: No suspicious parenchymal lesion or biliary ductal dilatation.    SPLEEN: Unremarkable.    PANCREAS: Peripancreatic inflammation surrounding pancreatic head/neck and surrounding duodenum. No drainable fluid collection. Patent splenic vein..    ADRENAL GLANDS: Unremarkable.    KIDNEYS: Symmetric renal enhancement without hydronephrosis. Left renal cysts and additional bilateral subcentimeter hypodensities too small to further characterize..    ABDOMINOPELVICNODES: Unremarkable.    PELVIC ORGANS: Unremarkable.    PERITONEUM/MESENTERY/BOWEL: No bowel obstruction or pneumoperitoneum. Trace peripancreatic fluid. Normal caliber appendix.    BONES/SOFT TISSUES: No acute osseous abnormality..      IMPRESSION:    Peripancreatic inflammation surrounding pancreatic head/neck and extending over duodenum. Findings most consistent with pancreatitis and reactive duodenitis.    No drainable fluid collection.    --- End of Report ---              ELLIOT LANDAU MD; Attending Radiologist  This document has been electronically signed. Sep 13 2021 11:34AM (09-13-21 @ 11:26)      CARDIOLOGY TESTING  12 Lead ECG:   Ventricular Rate 59 BPM    Atrial Rate 59 BPM    P-R Interval 158 ms    QRS Duration 80 ms    Q-T Interval 400 ms    QTC Calculation(Bazett) 396 ms    P Axis 40 degrees    R Axis 38 degrees    T Axis 26 degrees    Diagnosis Line Sinus bradycardia  Confirmed by CATALINA CHRISTIANSON MD ([id number]) on 9/13/2021 11:18:54 AM (09-13-21 @ 08:17)      MEDICATIONS  atorvastatin 40  chlorhexidine 4% Liquid 1  enalapril 10  enoxaparin Injectable 40  pantoprazole  Injectable 40  sodium chloride 0.9%. 1000      ANTIBIOTICS:      All available historical data has been reviewed    ASSESSMENT  56y M admitted with ACUTE PANCREATITIS

## 2021-09-15 LAB
ALBUMIN SERPL ELPH-MCNC: 3.8 G/DL — SIGNIFICANT CHANGE UP (ref 3.5–5.2)
ALP SERPL-CCNC: 98 U/L — SIGNIFICANT CHANGE UP (ref 30–115)
ALT FLD-CCNC: 17 U/L — SIGNIFICANT CHANGE UP (ref 0–41)
ANION GAP SERPL CALC-SCNC: 13 MMOL/L — SIGNIFICANT CHANGE UP (ref 7–14)
AST SERPL-CCNC: 19 U/L — SIGNIFICANT CHANGE UP (ref 0–41)
BILIRUB SERPL-MCNC: 1.7 MG/DL — HIGH (ref 0.2–1.2)
BUN SERPL-MCNC: 10 MG/DL — SIGNIFICANT CHANGE UP (ref 10–20)
CALCIUM SERPL-MCNC: 8.5 MG/DL — SIGNIFICANT CHANGE UP (ref 8.5–10.1)
CHLORIDE SERPL-SCNC: 102 MMOL/L — SIGNIFICANT CHANGE UP (ref 98–110)
CO2 SERPL-SCNC: 22 MMOL/L — SIGNIFICANT CHANGE UP (ref 17–32)
COVID-19 SPIKE DOMAIN AB INTERP: POSITIVE
COVID-19 SPIKE DOMAIN ANTIBODY RESULT: >250 U/ML — HIGH
CREAT SERPL-MCNC: 0.9 MG/DL — SIGNIFICANT CHANGE UP (ref 0.7–1.5)
GLUCOSE SERPL-MCNC: 90 MG/DL — SIGNIFICANT CHANGE UP (ref 70–99)
HCT VFR BLD CALC: 39.6 % — LOW (ref 42–52)
HGB BLD-MCNC: 13.5 G/DL — LOW (ref 14–18)
MAGNESIUM SERPL-MCNC: 2 MG/DL — SIGNIFICANT CHANGE UP (ref 1.8–2.4)
MCHC RBC-ENTMCNC: 30.6 PG — SIGNIFICANT CHANGE UP (ref 27–31)
MCHC RBC-ENTMCNC: 34.1 G/DL — SIGNIFICANT CHANGE UP (ref 32–37)
MCV RBC AUTO: 89.8 FL — SIGNIFICANT CHANGE UP (ref 80–94)
NRBC # BLD: 0 /100 WBCS — SIGNIFICANT CHANGE UP (ref 0–0)
PLATELET # BLD AUTO: 149 K/UL — SIGNIFICANT CHANGE UP (ref 130–400)
POTASSIUM SERPL-MCNC: 4.1 MMOL/L — SIGNIFICANT CHANGE UP (ref 3.5–5)
POTASSIUM SERPL-SCNC: 4.1 MMOL/L — SIGNIFICANT CHANGE UP (ref 3.5–5)
PROT SERPL-MCNC: 5.7 G/DL — LOW (ref 6–8)
RBC # BLD: 4.41 M/UL — LOW (ref 4.7–6.1)
RBC # FLD: 12.4 % — SIGNIFICANT CHANGE UP (ref 11.5–14.5)
SARS-COV-2 IGG+IGM SERPL QL IA: >250 U/ML — HIGH
SARS-COV-2 IGG+IGM SERPL QL IA: POSITIVE
SODIUM SERPL-SCNC: 137 MMOL/L — SIGNIFICANT CHANGE UP (ref 135–146)
WBC # BLD: 11.57 K/UL — HIGH (ref 4.8–10.8)
WBC # FLD AUTO: 11.57 K/UL — HIGH (ref 4.8–10.8)

## 2021-09-15 PROCEDURE — 99233 SBSQ HOSP IP/OBS HIGH 50: CPT

## 2021-09-15 RX ORDER — PANTOPRAZOLE SODIUM 20 MG/1
40 TABLET, DELAYED RELEASE ORAL
Refills: 0 | Status: DISCONTINUED | OUTPATIENT
Start: 2021-09-15 | End: 2021-09-16

## 2021-09-15 RX ORDER — SODIUM CHLORIDE 9 MG/ML
1000 INJECTION INTRAMUSCULAR; INTRAVENOUS; SUBCUTANEOUS
Refills: 0 | Status: DISCONTINUED | OUTPATIENT
Start: 2021-09-15 | End: 2021-09-16

## 2021-09-15 RX ADMIN — PANTOPRAZOLE SODIUM 40 MILLIGRAM(S): 20 TABLET, DELAYED RELEASE ORAL at 13:31

## 2021-09-15 RX ADMIN — Medication 650 MILLIGRAM(S): at 08:05

## 2021-09-15 RX ADMIN — PANTOPRAZOLE SODIUM 40 MILLIGRAM(S): 20 TABLET, DELAYED RELEASE ORAL at 17:15

## 2021-09-15 RX ADMIN — CHLORHEXIDINE GLUCONATE 1 APPLICATION(S): 213 SOLUTION TOPICAL at 05:31

## 2021-09-15 RX ADMIN — SODIUM CHLORIDE 200 MILLILITER(S): 9 INJECTION INTRAMUSCULAR; INTRAVENOUS; SUBCUTANEOUS at 05:31

## 2021-09-15 RX ADMIN — SODIUM CHLORIDE 100 MILLILITER(S): 9 INJECTION INTRAMUSCULAR; INTRAVENOUS; SUBCUTANEOUS at 17:20

## 2021-09-15 RX ADMIN — Medication 30 MILLILITER(S): at 23:12

## 2021-09-15 RX ADMIN — Medication 1 TABLET(S): at 17:15

## 2021-09-15 RX ADMIN — Medication 650 MILLIGRAM(S): at 09:05

## 2021-09-15 RX ADMIN — SODIUM CHLORIDE 200 MILLILITER(S): 9 INJECTION INTRAMUSCULAR; INTRAVENOUS; SUBCUTANEOUS at 00:17

## 2021-09-15 RX ADMIN — MORPHINE SULFATE 2 MILLIGRAM(S): 50 CAPSULE, EXTENDED RELEASE ORAL at 08:05

## 2021-09-15 RX ADMIN — SODIUM CHLORIDE 200 MILLILITER(S): 9 INJECTION INTRAMUSCULAR; INTRAVENOUS; SUBCUTANEOUS at 13:24

## 2021-09-15 RX ADMIN — MORPHINE SULFATE 2 MILLIGRAM(S): 50 CAPSULE, EXTENDED RELEASE ORAL at 08:20

## 2021-09-15 RX ADMIN — Medication 10 MILLIGRAM(S): at 05:31

## 2021-09-15 RX ADMIN — ATORVASTATIN CALCIUM 40 MILLIGRAM(S): 80 TABLET, FILM COATED ORAL at 13:33

## 2021-09-16 ENCOUNTER — TRANSCRIPTION ENCOUNTER (OUTPATIENT)
Age: 56
End: 2021-09-16

## 2021-09-16 VITALS
DIASTOLIC BLOOD PRESSURE: 86 MMHG | RESPIRATION RATE: 16 BRPM | TEMPERATURE: 97 F | HEART RATE: 55 BPM | SYSTOLIC BLOOD PRESSURE: 151 MMHG

## 2021-09-16 PROCEDURE — 99239 HOSP IP/OBS DSCHRG MGMT >30: CPT

## 2021-09-16 RX ADMIN — Medication 30 MILLILITER(S): at 05:22

## 2021-09-16 RX ADMIN — Medication 10 MILLIGRAM(S): at 05:41

## 2021-09-16 RX ADMIN — CHLORHEXIDINE GLUCONATE 1 APPLICATION(S): 213 SOLUTION TOPICAL at 05:41

## 2021-09-16 RX ADMIN — PANTOPRAZOLE SODIUM 40 MILLIGRAM(S): 20 TABLET, DELAYED RELEASE ORAL at 05:22

## 2021-09-16 NOTE — DISCHARGE NOTE NURSING/CASE MANAGEMENT/SOCIAL WORK - NSDCPEFALRISK_GEN_ALL_CORE
For information on Fall & injury Prevention, visit [email or web address]

## 2021-09-16 NOTE — DISCHARGE NOTE PROVIDER - NSDCMRMEDTOKEN_GEN_ALL_CORE_FT
Dexilant 60 mg oral delayed release capsule: 1 cap(s) orally once a day  enalapril 10 mg oral tablet: 1 tab(s) orally once a day  rosuvastatin 10 mg oral tablet: 1 tab(s) orally once a day

## 2021-09-16 NOTE — DISCHARGE NOTE PROVIDER - HOSPITAL COURSE
The patient is a 55 y/o male with PMH of hld, htn, acid reflux, alcoholism, and erosive gastritis, presenting with epigastric abdominal pain for 3 days. He state the pain began while eating at a  BBQ and drinking 2 beers. Patient describes the pain as being located in epigastric region, sharp, constant, and without radiation. Denies  aggravating factors. He states he has never had pancreatitis before. Patient states he used to be a binge drinker, drinking until blackout and cut down 1 year ago to drinking only 2-3 drinks few times a month. Patient follows with his GI doctor in [facility]. His last endoscopy was 1 year ago and was diagnosed with erosive gastritis. Denies fever, chills, n/v, diarrhea.     Patient found to have pancreatitis and treated with IVF.  His diet was slowly advanced which he tolerated  Patient feels well and is going home today    Patient seen and examined this morning. Lying comfortably in bed.  In NAD    Vital Signs Last 24 Hrs  T(C): 36.2 (16 Sep 2021 05:00), Max: 36.3 (15 Sep 2021 21:15)  T(F): 97.2 (16 Sep 2021 05:00), Max: 97.3 (15 Sep 2021 21:15)  HR: 55 (16 Sep 2021 05:00) (55 - 60)  BP: 151/86 (16 Sep 2021 05:00) (116/62 - 151/86)  BP(mean): --  RR: 16 (16 Sep 2021 05:00) (16 - 16)  SpO2: --    PHYSICAL EXAM:  GENERAL: NAD, well-groomed, well-developed  HEAD:  Atraumatic, Normocephalic  EYES: EOMI, PERRLA, conjunctiva and sclera clear  NERVOUS SYSTEM:  Alert & Oriented X 4, Good concentration; Motor Strength 5/5 B/L upper and lower extremities; DTRs 2+ intact and symmetric  CHEST/LUNG: Clear to percussion bilaterally; No rales, rhonchi, wheezing, or rubs  HEART: Regular rate and rhythm; No murmurs, rubs, or gallops  ABDOMEN: Soft, Nontender, Nondistended; Bowel sounds present, obese  EXTREMITIES:  2+ Peripheral Pulses, No clubbing, cyanosis, or edema  SKIN: No rashes or lesions    D/C today; d/c planning took over 45 min  d/c papers done by me  discussed d/c plan and all instructions in detail

## 2021-09-16 NOTE — DISCHARGE NOTE PROVIDER - NSDCCPCAREPLAN_GEN_ALL_CORE_FT
PRINCIPAL DISCHARGE DIAGNOSIS  Diagnosis: Acute pancreatitis  Assessment and Plan of Treatment: you were treated with IV fluids and improved  you ate and felt well  please follow up with your PMD  please stop drinking alcohol

## 2021-09-23 DIAGNOSIS — E66.9 OBESITY, UNSPECIFIED: ICD-10-CM

## 2021-09-23 DIAGNOSIS — K29.80 DUODENITIS WITHOUT BLEEDING: ICD-10-CM

## 2021-09-23 DIAGNOSIS — K29.60 OTHER GASTRITIS WITHOUT BLEEDING: ICD-10-CM

## 2021-09-23 DIAGNOSIS — I10 ESSENTIAL (PRIMARY) HYPERTENSION: ICD-10-CM

## 2021-09-23 DIAGNOSIS — R07.9 CHEST PAIN, UNSPECIFIED: ICD-10-CM

## 2021-09-23 DIAGNOSIS — E78.5 HYPERLIPIDEMIA, UNSPECIFIED: ICD-10-CM

## 2021-09-23 DIAGNOSIS — K21.9 GASTRO-ESOPHAGEAL REFLUX DISEASE WITHOUT ESOPHAGITIS: ICD-10-CM

## 2021-09-23 DIAGNOSIS — Z87.891 PERSONAL HISTORY OF NICOTINE DEPENDENCE: ICD-10-CM

## 2021-09-23 DIAGNOSIS — K85.90 ACUTE PANCREATITIS WITHOUT NECROSIS OR INFECTION, UNSPECIFIED: ICD-10-CM

## 2022-05-28 ENCOUNTER — NON-APPOINTMENT (OUTPATIENT)
Age: 57
End: 2022-05-28

## 2022-12-14 NOTE — ASU PATIENT PROFILE, ADULT - AS SC BRADEN MOBILITY
(4) no limitation Adbry Pregnancy And Lactation Text: It is unknown if this medication will adversely affect pregnancy or breast feeding.

## 2023-11-03 PROBLEM — K21.9 GASTRO-ESOPHAGEAL REFLUX DISEASE WITHOUT ESOPHAGITIS: Chronic | Status: ACTIVE | Noted: 2021-09-13

## 2023-11-03 PROBLEM — K29.70 GASTRITIS, UNSPECIFIED, WITHOUT BLEEDING: Chronic | Status: ACTIVE | Noted: 2021-09-13

## 2023-11-03 PROBLEM — E78.5 HYPERLIPIDEMIA, UNSPECIFIED: Chronic | Status: ACTIVE | Noted: 2021-09-13

## 2023-11-15 ENCOUNTER — APPOINTMENT (OUTPATIENT)
Dept: ORTHOPEDIC SURGERY | Facility: CLINIC | Age: 58
End: 2023-11-15
Payer: COMMERCIAL

## 2023-11-15 DIAGNOSIS — M23.91 UNSPECIFIED INTERNAL DERANGEMENT OF RIGHT KNEE: ICD-10-CM

## 2023-11-15 DIAGNOSIS — M75.51 BURSITIS OF RIGHT SHOULDER: ICD-10-CM

## 2023-11-15 PROCEDURE — 99204 OFFICE O/P NEW MOD 45 MIN: CPT

## 2023-11-15 PROCEDURE — 73030 X-RAY EXAM OF SHOULDER: CPT | Mod: RT

## 2023-12-20 ENCOUNTER — RX RENEWAL (OUTPATIENT)
Age: 58
End: 2023-12-20

## 2024-01-11 ENCOUNTER — APPOINTMENT (OUTPATIENT)
Dept: ORTHOPEDIC SURGERY | Facility: CLINIC | Age: 59
End: 2024-01-11
Payer: OTHER MISCELLANEOUS

## 2024-01-11 DIAGNOSIS — S83.271A COMPLEX TEAR OF LATERAL MENISCUS, CURRENT INJURY, RIGHT KNEE, INITIAL ENCOUNTER: ICD-10-CM

## 2024-01-11 PROCEDURE — 99213 OFFICE O/P EST LOW 20 MIN: CPT | Mod: ACP,25

## 2024-01-11 PROCEDURE — 73560 X-RAY EXAM OF KNEE 1 OR 2: CPT | Mod: RT

## 2024-01-11 NOTE — IMAGING
[de-identified] : Physical exam of the right knee: Mild effusion.  No erythema or ecchymosis.  Full extension, flexion to 120 degrees.  Pain with flexion.  Tenderness to palpation over the lateral joint line.  He has tenderness to palpation over the lateral patella facet.  No tenderness to palpation over the collateral ligaments.  Positive Siddhartha's testing laterally.  Intact to light touch.  Mild antalgic gait.

## 2024-01-11 NOTE — HISTORY OF PRESENT ILLNESS
[de-identified] : The patient is a 58-year-old male who works for [employer] here for evaluation of his right knee.  On 1/10/2024 at work he slipped on the step of a truck and had with debris on it and twisted his knee.  He developed immediate pain and swelling.  He was seen at [facility] and had x-rays of his right knee and told that he has arthritis.  He does not have those images with him here today.  He is accompanied by his spouse here in the office today.

## 2024-01-11 NOTE — DISCUSSION/SUMMARY
[de-identified] : I reviewed the x-ray findings with the patient and his spouse.  I recommend MRI of the right knee to evaluate for a lateral meniscus tear.  I recommend formal physical therapy for the knee as well.  He has a history of gastritis and cannot take oral anti-inflammatory medication.  Today I recommend a cortisone injection for the right knee, the patient agreed.  The right knee was injected with 2 cc lidocaine, 1 cc dexamethasone.  Procedure note generated.  He will call me 2 days after the MRI is performed so we can discuss the results.  He will follow-up in 6 weeks for further evaluation with Dr. Muñoz.  He is unable to work, 100% temporarily disabled.

## 2024-01-11 NOTE — DATA REVIEWED
[Right] : of the right [Knee] : knee [FreeTextEntry1] : 3 views of the right knee were obtained here in the office today and show: Well-preserved medial and lateral compartments.  No fractures noted.  No soft tissue calcifications or bone masses.

## 2024-01-16 ENCOUNTER — APPOINTMENT (OUTPATIENT)
Dept: MRI IMAGING | Facility: CLINIC | Age: 59
End: 2024-01-16
Payer: OTHER MISCELLANEOUS

## 2024-01-16 PROCEDURE — 73721 MRI JNT OF LWR EXTRE W/O DYE: CPT | Mod: RT

## 2024-01-24 ENCOUNTER — APPOINTMENT (OUTPATIENT)
Dept: ORTHOPEDIC SURGERY | Facility: CLINIC | Age: 59
End: 2024-01-24
Payer: OTHER MISCELLANEOUS

## 2024-01-24 PROCEDURE — 99213 OFFICE O/P EST LOW 20 MIN: CPT | Mod: ACP

## 2024-01-24 NOTE — IMAGING
[de-identified] : Physical exam of the right knee: Mild effusion.  No erythema or ecchymosis.  Full extension, flexion to 100 degrees with pain.  Tenderness to palpation over the lateral joint line.  No medial joint line tenderness to palpation.  No tenderness to palpation over the collateral ligaments or the anterior aspect of the knee.  Positive Siddhartha's laterally.  Intact to light touch, he is ambulating with a cane today.

## 2024-01-24 NOTE — HISTORY OF PRESENT ILLNESS
[de-identified] : The patient is a 58-year-old male who works for [employer] here for a subsequent reevaluation of his right knee.  On 1/10/2024 at work he slipped on the step of a truck and had with debris on it and twisted his knee.  His MRI came back showing a lateral meniscus tear.  He has had swelling of the knee.  He also has buckling and locking.  He cannot squat.  He points laterally as to where the pain is.  He had a cortisone injection with no relief.

## 2024-01-24 NOTE — DISCUSSION/SUMMARY
[de-identified] : Today we discussed a treatment plan.  Dr. Muñoz and I saw the patient together.  He is having buckling, and cannot squat and has had intermittent locking of the knee we recommend right knee arthroscopy to address the locking of the knee since this is a mechanical symptoms secondary to his meniscus tear.  Please send authorization for a right knee arthroscopy with Dr. Muñoz.  He also needs formal physical therapy, please send authorization for that.  The meantime we will start home therapy exercises, he was given a printout from the [employer] for a knee conditioning program.  He is unable to work, 100% temporarily disabled.  We will see him in 6 weeks for further evaluation.

## 2024-01-26 NOTE — PROGRESS NOTE ADULT - SUBJECTIVE AND OBJECTIVE BOX
Patient called stating that the lab work that was ordered by Kirstin has . She is asking that the order be renewed.      SANDRAARTHUR  56y  Male      Patient is a 56y old male who presents with pancreatitis (14 Sep 2021 11:33)      INTERVAL HPI/OVERNIGHT EVENTS:  Patient seen and examined earlier this morning  lying comfortably in bed  asking to eat      REVIEW OF SYSTEMS:  CONSTITUTIONAL: No fever, weight loss, or fatigue  EYES: No eye pain, visual disturbances, or discharge  ENMT:  No difficulty hearing, tinnitus, vertigo; No sinus or throat pain  NECK: No pain or stiffness  RESPIRATORY: No cough, wheezing, chills or hemoptysis; No shortness of breath  CARDIOVASCULAR: No chest pain, palpitations, dizziness, or leg swelling  GASTROINTESTINAL: No abdominal or epigastric pain. No nausea, vomiting, or hematemesis; No diarrhea or constipation. No melena or hematochezia.  GENITOURINARY: No dysuria, frequency, hematuria, or incontinence  NEUROLOGICAL: No headaches, memory loss, loss of strength, numbness, or tremors  SKIN: No itching, burning, rashes, or lesions   LYMPH NODES: No enlarged glands  ENDOCRINE: No heat or cold intolerance; No hair loss  MUSCULOSKELETAL: No joint pain or swelling; No muscle, back, or extremity pain  PSYCHIATRIC: No depression, anxiety, mood swings, or difficulty sleeping  HEME/LYMPH: No easy bruising, or bleeding gums  ALLERY AND IMMUNOLOGIC: No hives or eczema    T(C): 36.2 (09-15-21 @ 05:07), Max: 37.8 (09-14-21 @ 14:21)  HR: 59 (09-15-21 @ 05:07) (59 - 63)  BP: 136/86 (09-15-21 @ 05:07) (136/86 - 168/81)  RR: 17 (09-15-21 @ 05:07) (16 - 17)  SpO2: --    PHYSICAL EXAM:  GENERAL: NAD, well-groomed, well-developed  HEAD:  Atraumatic, Normocephalic  EYES: EOMI, PERRLA, conjunctiva and sclera clear  ENMT: No tonsillar erythema, exudates, or enlargement; Moist mucous membranes, Good dentition, No lesions  NECK: Supple, No JVD, Normal thyroid  NERVOUS SYSTEM:  Alert & Oriented X3, Good concentration; Motor Strength 5/5 B/L upper and lower extremities; DTRs 2+ intact and symmetric  CHEST/LUNG: Clear to percussion bilaterally; No rales, rhonchi, wheezing, or rubs  HEART: Regular rate and rhythm; No murmurs, rubs, or gallops  ABDOMEN: Soft, mild ttp epigastric area, Nondistended; Bowel sounds present, obese  EXTREMITIES:  2+ Peripheral Pulses, No clubbing, cyanosis, or edema  LYMPH: No lymphadenopathy noted  SKIN: No rashes or lesions    Consultant(s) Notes Reviewed:  [x ] YES  [ ] NO  Care Discussed with Consultants/Other Providers [ x] YES  [ ] NO    LAB:                        13.5   11.57 )-----------( 149      ( 15 Sep 2021 06:40 )             39.6     09-15    137  |  102  |  10  ----------------------------<  90  4.1   |  22  |  0.9    Ca    8.5      15 Sep 2021 06:40  Mg     2.0     09-15    TPro  5.7<L>  /  Alb  3.8  /  TBili  1.7<H>  /  DBili  x   /  AST  19  /  ALT  17  /  AlkPhos  98  09-15    LIVER FUNCTIONS - ( 15 Sep 2021 06:40 )  Alb: 3.8 g/dL / Pro: 5.7 g/dL / ALK PHOS: 98 U/L / ALT: 17 U/L / AST: 19 U/L / GGT: x                 Drug Dosing Weight  Height (cm): 172.7 (13 Sep 2021 15:44)  Weight (kg): 98.2 (13 Sep 2021 15:44)  BMI (kg/m2): 32.9 (13 Sep 2021 15:44)  BSA (m2): 2.11 (13 Sep 2021 15:44)          I&O's Summary    14 Sep 2021 07:01  -  15 Sep 2021 07:00  --------------------------------------------------------  IN: 2000 mL / OUT: 0 mL / NET: 2000 mL          RADIOLOGY & ADDITIONAL TESTS:  Imaging Personally Reviewed:  [x] YES  [ ] NO    HEALTH ISSUES - PROBLEM Dx:  Acute pancreatitis    Acid reflux    Hypertension    Hyperlipemia            MEDS:  acetaminophen   Tablet .. 650 milliGRAM(s) Oral every 8 hours PRN  aluminum hydroxide/magnesium hydroxide/simethicone Suspension 30 milliLiter(s) Oral every 4 hours PRN  atorvastatin 40 milliGRAM(s) Oral daily  chlorhexidine 4% Liquid 1 Application(s) Topical <User Schedule>  enalapril 10 milliGRAM(s) Oral daily  enoxaparin Injectable 40 milliGRAM(s) SubCutaneous daily  multivitamin 1 Tablet(s) Oral daily  ondansetron Injectable 4 milliGRAM(s) IV Push every 4 hours PRN  pantoprazole  Injectable 40 milliGRAM(s) IV Push daily  sodium chloride 0.9%. 1000 milliLiter(s) IV Continuous <Continuous>

## 2024-01-31 ENCOUNTER — OUTPATIENT (OUTPATIENT)
Dept: OUTPATIENT SERVICES | Facility: HOSPITAL | Age: 59
LOS: 1 days | End: 2024-01-31
Payer: COMMERCIAL

## 2024-01-31 DIAGNOSIS — Z98.890 OTHER SPECIFIED POSTPROCEDURAL STATES: Chronic | ICD-10-CM

## 2024-01-31 DIAGNOSIS — Z01.818 ENCOUNTER FOR OTHER PREPROCEDURAL EXAMINATION: ICD-10-CM

## 2024-01-31 PROCEDURE — 93005 ELECTROCARDIOGRAM TRACING: CPT

## 2024-01-31 PROCEDURE — 93010 ELECTROCARDIOGRAM REPORT: CPT | Mod: NC

## 2024-02-01 DIAGNOSIS — Z01.818 ENCOUNTER FOR OTHER PREPROCEDURAL EXAMINATION: ICD-10-CM

## 2024-02-08 ENCOUNTER — APPOINTMENT (OUTPATIENT)
Dept: ORTHOPEDIC SURGERY | Facility: CLINIC | Age: 59
End: 2024-02-08

## 2024-02-09 ENCOUNTER — APPOINTMENT (OUTPATIENT)
Dept: ORTHOPEDIC SURGERY | Facility: AMBULATORY SURGERY CENTER | Age: 59
End: 2024-02-09
Payer: OTHER MISCELLANEOUS

## 2024-02-09 PROCEDURE — 29881 ARTHRS KNE SRG MNISECTMY M/L: CPT | Mod: RT

## 2024-02-09 PROCEDURE — 29876 ARTHRS KNEE SURG SYNVCT MAJ: CPT | Mod: RT

## 2024-02-14 ENCOUNTER — APPOINTMENT (OUTPATIENT)
Dept: ORTHOPEDIC SURGERY | Facility: CLINIC | Age: 59
End: 2024-02-14
Payer: OTHER MISCELLANEOUS

## 2024-02-14 VITALS — BODY MASS INDEX: 33.65 KG/M2 | HEIGHT: 68 IN | WEIGHT: 222 LBS

## 2024-02-14 PROCEDURE — 99024 POSTOP FOLLOW-UP VISIT: CPT

## 2024-02-14 NOTE — DISCUSSION/SUMMARY
[de-identified] : Today he was advised he can be weightbearing as tolerated.  He was advised to wean off of the crutches.  He will start formal therapy, Rx provided for that.  I will see him back in 6 weeks for further evaluation, he is unable to work, 100% temporarily disabled.  Of note I spoke to the patient's spouse via telephone during the office visit today.

## 2024-02-14 NOTE — HISTORY OF PRESENT ILLNESS
[de-identified] : The patient is a 58-year-old male here for a subsequent reevaluation of his right knee.  He is status post right knee arthroscopy, partial lateral meniscectomy, chondroplasty of the medial femoral condyle, extensive synovectomy and excision of plica on 2/9/2024.  This is his initial postoperative visit.  He states his knee is feeling a bit better each day.

## 2024-02-14 NOTE — IMAGING
[de-identified] : Physical exam of the right knee: Mild effusion.  No erythema or ecchymosis.  Full extension, flexion to 100 degrees.  The surgical portals are healing well.  Clean, dry, intact.  No surrounding erythema or drainage noted.  No calf pain, negative Homans' sign.  Intact to light touch, he is ambulating with crutches today.

## 2024-03-04 ENCOUNTER — APPOINTMENT (OUTPATIENT)
Dept: ORTHOPEDIC SURGERY | Facility: CLINIC | Age: 59
End: 2024-03-04

## 2024-03-27 ENCOUNTER — APPOINTMENT (OUTPATIENT)
Dept: ORTHOPEDIC SURGERY | Facility: CLINIC | Age: 59
End: 2024-03-27
Payer: OTHER MISCELLANEOUS

## 2024-03-27 PROCEDURE — 99024 POSTOP FOLLOW-UP VISIT: CPT

## 2024-03-27 NOTE — IMAGING
[de-identified] : Physical exam of the right knee: No effusion, erythema, no ecchymosis.  Full extension, flexion to 120 degrees.  Mild medial joint line tenderness to palpation, no lateral joint line tenderness to palpation.  No tenderness to palpation over the collateral ligaments or the anterior aspect of the knee.  Negative Esparza's testing.  Intact to light touch, nonantalgic gait.

## 2024-03-27 NOTE — HISTORY OF PRESENT ILLNESS
[de-identified] : The patient is a 59-year-old male here for a subsequent reevaluation of his right knee.  He is status post right knee arthroscopy, partial lateral meniscectomy, chondroplasty medial femoral condyle and excision of medial plica on 2/9/2024.  He is 6 weeks out from his surgery.  He is doing formal physical therapy with Dru Pagan and reports that his knee is improving.  He would like to return to work light duty on 4/2/2024.

## 2024-03-27 NOTE — DISCUSSION/SUMMARY
[de-identified] : At this point his right knee has improved nicely.  I recommend he continues formal therapy for stretching, strengthening, range of motion.  Rx provided for that.  He may return to work light duty, mild degree of disability on 4/2/2024.  I will see him back in 2 months for further evaluation.

## 2024-04-15 PROBLEM — Z78.9 CURRENT NON-SMOKER: Status: ACTIVE | Noted: 2024-04-15

## 2024-04-16 ENCOUNTER — APPOINTMENT (OUTPATIENT)
Dept: CARDIOLOGY | Facility: CLINIC | Age: 59
End: 2024-04-16

## 2024-04-16 DIAGNOSIS — Z78.9 OTHER SPECIFIED HEALTH STATUS: ICD-10-CM

## 2024-04-30 ENCOUNTER — APPOINTMENT (OUTPATIENT)
Dept: ORTHOPEDIC SURGERY | Facility: CLINIC | Age: 59
End: 2024-04-30
Payer: OTHER MISCELLANEOUS

## 2024-04-30 DIAGNOSIS — S83.271D COMPLEX TEAR OF LATERAL MENISCUS, CURRENT INJURY, RIGHT KNEE, SUBSEQUENT ENCOUNTER: ICD-10-CM

## 2024-04-30 PROCEDURE — 99024 POSTOP FOLLOW-UP VISIT: CPT

## 2024-04-30 NOTE — IMAGING
[de-identified] : Physical exam of the right knee: No effusion, erythema, no ecchymosis.  Good range of motion of the right knee with flexion and extension.  No tenderness to palpation over the joint lines.  No tenderness to palpation over the collateral ligaments or the anterior aspect of the knee.  Negative Esparza's testing.  Intact to light touch, nonantalgic gait.

## 2024-04-30 NOTE — HISTORY OF PRESENT ILLNESS
[de-identified] : The patient is a 59-year-old male here for a subsequent reevaluation of his right knee.  He is status post right knee arthroscopy, partial lateral meniscectomy, chondroplasty medial femoral condyle and excision of medial plica on 2/9/2024.  He is almost 3 months out from his surgery surgery.  He is still doing formal physical therapy with Dru Pagan and reports that his knee is doing very well.  He would like to return to work full duty on 5/7/2024.

## 2024-04-30 NOTE — DISCUSSION/SUMMARY
[de-identified] : At this point his knee is doing very well.  He will finish up the remaining formal physical therapy sessions and then convert to a home program.  He will return to work full duty, no restrictions on 5/7/2024.  I will see him back in 2 months for further evaluation.  He will be working full duty, mild degree of disability as of 5/7/2024. minimal assistance

## 2024-06-04 ENCOUNTER — APPOINTMENT (OUTPATIENT)
Dept: CARDIOLOGY | Facility: CLINIC | Age: 59
End: 2024-06-04
Payer: COMMERCIAL

## 2024-06-04 VITALS
BODY MASS INDEX: 32.74 KG/M2 | WEIGHT: 216 LBS | SYSTOLIC BLOOD PRESSURE: 138 MMHG | HEART RATE: 79 BPM | HEIGHT: 68 IN | OXYGEN SATURATION: 95 % | DIASTOLIC BLOOD PRESSURE: 93 MMHG

## 2024-06-04 VITALS — SYSTOLIC BLOOD PRESSURE: 132 MMHG | DIASTOLIC BLOOD PRESSURE: 88 MMHG

## 2024-06-04 DIAGNOSIS — Z82.49 FAMILY HISTORY OF ISCHEMIC HEART DISEASE AND OTHER DISEASES OF THE CIRCULATORY SYSTEM: ICD-10-CM

## 2024-06-04 DIAGNOSIS — Z80.3 FAMILY HISTORY OF MALIGNANT NEOPLASM OF BREAST: ICD-10-CM

## 2024-06-04 DIAGNOSIS — Z87.898 PERSONAL HISTORY OF OTHER SPECIFIED CONDITIONS: ICD-10-CM

## 2024-06-04 DIAGNOSIS — Z87.891 PERSONAL HISTORY OF NICOTINE DEPENDENCE: ICD-10-CM

## 2024-06-04 DIAGNOSIS — F10.11 ALCOHOL ABUSE, IN REMISSION: ICD-10-CM

## 2024-06-04 DIAGNOSIS — I10 ESSENTIAL (PRIMARY) HYPERTENSION: ICD-10-CM

## 2024-06-04 DIAGNOSIS — K85.20 ALCOHOL INDUCED ACUTE PANCREATITIS WITHOUT NECROSIS OR INFECTION: ICD-10-CM

## 2024-06-04 DIAGNOSIS — Z80.6 FAMILY HISTORY OF LEUKEMIA: ICD-10-CM

## 2024-06-04 DIAGNOSIS — E78.5 HYPERLIPIDEMIA, UNSPECIFIED: ICD-10-CM

## 2024-06-04 DIAGNOSIS — Z81.8 FAMILY HISTORY OF OTHER MENTAL AND BEHAVIORAL DISORDERS: ICD-10-CM

## 2024-06-04 PROCEDURE — 99204 OFFICE O/P NEW MOD 45 MIN: CPT | Mod: 25

## 2024-06-04 PROCEDURE — 93000 ELECTROCARDIOGRAM COMPLETE: CPT

## 2024-06-04 RX ORDER — FAMOTIDINE 40 MG/1
40 TABLET, FILM COATED ORAL
Refills: 0 | Status: ACTIVE | COMMUNITY

## 2024-06-04 RX ORDER — SEMAGLUTIDE 0.25 MG/.5ML
0.25 INJECTION, SOLUTION SUBCUTANEOUS WEEKLY
Refills: 0 | Status: ACTIVE | COMMUNITY

## 2024-06-04 RX ORDER — TRAMADOL HYDROCHLORIDE 50 MG/1
50 TABLET, COATED ORAL
Qty: 30 | Refills: 0 | Status: DISCONTINUED | COMMUNITY
Start: 2024-02-09 | End: 2024-06-04

## 2024-06-04 RX ORDER — ENALAPRIL MALEATE 10 MG/1
10 TABLET ORAL DAILY
Qty: 90 | Refills: 3 | Status: ACTIVE | COMMUNITY

## 2024-06-04 RX ORDER — DEXLANSOPRAZOLE 60 MG/1
60 CAPSULE, DELAYED RELEASE ORAL DAILY
Refills: 0 | Status: ACTIVE | COMMUNITY

## 2024-06-04 RX ORDER — LIDOCAINE 4 G/100G
4 PATCH TOPICAL
Qty: 30 | Refills: 5 | Status: DISCONTINUED | COMMUNITY
Start: 2023-11-15 | End: 2024-06-04

## 2024-06-04 RX ORDER — ROSUVASTATIN CALCIUM 10 MG/1
10 TABLET, FILM COATED ORAL DAILY
Refills: 0 | Status: ACTIVE | COMMUNITY

## 2024-06-04 RX ORDER — ACETAMINOPHEN 500 MG/1
500 TABLET ORAL 3 TIMES DAILY
Qty: 120 | Refills: 0 | Status: DISCONTINUED | COMMUNITY
Start: 2023-11-15 | End: 2024-06-04

## 2024-06-04 RX ORDER — MELOXICAM 7.5 MG/1
7.5 TABLET ORAL
Qty: 30 | Refills: 0 | Status: DISCONTINUED | COMMUNITY
Start: 2023-11-15 | End: 2024-06-04

## 2024-06-04 NOTE — REASON FOR VISIT
[FreeTextEntry1] : Diagnostic Tests: ------------------------ EK24-Sinus bradycardia at 58 bpm.  24-NSR. Normal EKG.

## 2024-06-04 NOTE — ASSESSMENT
[FreeTextEntry1] : HTN: BP near goal per ACC/AHA 2018 guidelines -Check TTE.  -Continue with enalapril 10mg PO daily.   HLD: , TG 83, HDL 52, LDL 53 (09/21)->, , HDL 41, LDL 95 (03/24) -Continue with rosuvastatin 10mg PO daily.  -Check CT CAC -Discussed therapeutic lifestyle changes to promote improved lipid metabolism.   EtOH abuse: -Now in remission.   Follow up in 3-4 months.

## 2024-06-04 NOTE — HISTORY OF PRESENT ILLNESS
[FreeTextEntry1] : Mr. Oleary is a 58yo M with PMHx of HTN, HLD, GERD, alcohol abuse, alcoholic pancreatitis who presents to establish care. His PMD is Dr. Eugene Singh. He is feeling well overall. He previously had followed with Dr. Avalos in [place] but he recently retired. He recently started on Wegovy and is tolerating well but had one episode of palpitations. He took benzo and it helped. He otherwise denies CP, SOB, MARIE, LE edema.

## 2024-06-25 ENCOUNTER — APPOINTMENT (OUTPATIENT)
Dept: ORTHOPEDIC SURGERY | Facility: CLINIC | Age: 59
End: 2024-06-25

## 2024-06-26 ENCOUNTER — RESULT REVIEW (OUTPATIENT)
Age: 59
End: 2024-06-26

## 2024-06-26 ENCOUNTER — APPOINTMENT (OUTPATIENT)
Dept: CV DIAGNOSITCS | Facility: HOSPITAL | Age: 59
End: 2024-06-26
Payer: COMMERCIAL

## 2024-06-26 ENCOUNTER — OUTPATIENT (OUTPATIENT)
Dept: OUTPATIENT SERVICES | Facility: HOSPITAL | Age: 59
LOS: 1 days | End: 2024-06-26
Payer: COMMERCIAL

## 2024-06-26 DIAGNOSIS — Z98.890 OTHER SPECIFIED POSTPROCEDURAL STATES: Chronic | ICD-10-CM

## 2024-06-26 DIAGNOSIS — I10 ESSENTIAL (PRIMARY) HYPERTENSION: ICD-10-CM

## 2024-06-26 PROCEDURE — 93306 TTE W/DOPPLER COMPLETE: CPT

## 2024-06-26 PROCEDURE — 93306 TTE W/DOPPLER COMPLETE: CPT | Mod: 26

## 2024-06-27 DIAGNOSIS — I10 ESSENTIAL (PRIMARY) HYPERTENSION: ICD-10-CM

## 2024-09-09 ENCOUNTER — APPOINTMENT (OUTPATIENT)
Dept: CARDIOLOGY | Facility: CLINIC | Age: 59
End: 2024-09-09
Payer: COMMERCIAL

## 2024-09-09 VITALS
SYSTOLIC BLOOD PRESSURE: 118 MMHG | BODY MASS INDEX: 30.92 KG/M2 | HEART RATE: 70 BPM | DIASTOLIC BLOOD PRESSURE: 74 MMHG | HEIGHT: 68 IN | OXYGEN SATURATION: 98 % | WEIGHT: 204 LBS

## 2024-09-09 DIAGNOSIS — M62.08 SEPARATION OF MUSCLE (NONTRAUMATIC), OTHER SITE: ICD-10-CM

## 2024-09-09 DIAGNOSIS — K43.6 OTHER AND UNSPECIFIED VENTRAL HERNIA WITH OBSTRUCTION, W/OUT GANGRENE: ICD-10-CM

## 2024-09-09 DIAGNOSIS — E78.5 HYPERLIPIDEMIA, UNSPECIFIED: ICD-10-CM

## 2024-09-09 DIAGNOSIS — I10 ESSENTIAL (PRIMARY) HYPERTENSION: ICD-10-CM

## 2024-09-09 PROCEDURE — 99214 OFFICE O/P EST MOD 30 MIN: CPT

## 2024-09-09 PROCEDURE — G2211 COMPLEX E/M VISIT ADD ON: CPT | Mod: NC

## 2024-09-09 RX ORDER — ACETAMINOPHEN/DIPHENHYDRAMINE 500MG-25MG
TABLET ORAL
Refills: 0 | Status: ACTIVE | COMMUNITY

## 2024-09-09 NOTE — REASON FOR VISIT
[Other: ____] : [unfilled] [FreeTextEntry1] : Diagnostic Tests: ------------------------ EK24-Sinus bradycardia at 58 bpm.  24-NSR. Normal EKG.  ------------------------ Echo:  24-TTE: EF 50-55%. Asc Ao 3.6 cm.  ------------------------ CT:  24-CT CAC: CAC 0.

## 2024-09-09 NOTE — ASSESSMENT
[FreeTextEntry1] : HTN: BP at goal per ACC/AHA 2018 guidelines -Continue with enalapril 10mg PO daily.   HLD: , TG 83, HDL 52, LDL 53 (09/21)->, , HDL 41, LDL 95 (03/24) -Continue with rosuvastatin 10mg PO daily.  -CAC 0.  -Discussed therapeutic lifestyle changes to promote improved lipid metabolism.   EtOH abuse: -Now in remission.   Follow up in 6 months

## 2024-09-09 NOTE — HISTORY OF PRESENT ILLNESS
[FreeTextEntry1] : Mr. Oleary is a 58yo M with PMHx of HTN, HLD, GERD, alcohol abuse, alcoholic pancreatitis who presents for follow up. His PMD is Dr. Eugene Singh. He is feeling well overall. He previously had followed with Dr. Avalos in [place] but he recently retired. He recently started on Wegovy and is tolerating well but had one episode of palpitations. He took benzo and it helped. He otherwise denies CP, SOB, MARIE, LE edema.  09/09/24-Patient is doing well. No new complaints. He has been losing weight with Wegovy. He is still exercising. He will get some nausea, vomiting and diarrhea with Wegovy sometimes.

## 2025-01-09 ENCOUNTER — EMERGENCY (EMERGENCY)
Facility: HOSPITAL | Age: 60
LOS: 0 days | Discharge: HOME | End: 2025-01-09
Attending: EMERGENCY MEDICINE
Payer: COMMERCIAL

## 2025-01-09 VITALS
DIASTOLIC BLOOD PRESSURE: 80 MMHG | SYSTOLIC BLOOD PRESSURE: 126 MMHG | RESPIRATION RATE: 18 BRPM | OXYGEN SATURATION: 97 % | HEART RATE: 99 BPM

## 2025-01-09 VITALS
SYSTOLIC BLOOD PRESSURE: 136 MMHG | DIASTOLIC BLOOD PRESSURE: 78 MMHG | HEART RATE: 100 BPM | RESPIRATION RATE: 20 BRPM | TEMPERATURE: 100 F | OXYGEN SATURATION: 94 %

## 2025-01-09 DIAGNOSIS — Z98.890 OTHER SPECIFIED POSTPROCEDURAL STATES: Chronic | ICD-10-CM

## 2025-01-09 DIAGNOSIS — R05.1 ACUTE COUGH: ICD-10-CM

## 2025-01-09 DIAGNOSIS — I10 ESSENTIAL (PRIMARY) HYPERTENSION: ICD-10-CM

## 2025-01-09 DIAGNOSIS — K21.9 GASTRO-ESOPHAGEAL REFLUX DISEASE WITHOUT ESOPHAGITIS: ICD-10-CM

## 2025-01-09 DIAGNOSIS — B34.9 VIRAL INFECTION, UNSPECIFIED: ICD-10-CM

## 2025-01-09 DIAGNOSIS — R50.9 FEVER, UNSPECIFIED: ICD-10-CM

## 2025-01-09 DIAGNOSIS — R09.81 NASAL CONGESTION: ICD-10-CM

## 2025-01-09 DIAGNOSIS — E78.5 HYPERLIPIDEMIA, UNSPECIFIED: ICD-10-CM

## 2025-01-09 LAB
ALBUMIN SERPL ELPH-MCNC: 4.5 G/DL — SIGNIFICANT CHANGE UP (ref 3.5–5.2)
ALP SERPL-CCNC: 91 U/L — SIGNIFICANT CHANGE UP (ref 30–115)
ALT FLD-CCNC: 20 U/L — SIGNIFICANT CHANGE UP (ref 0–41)
ANION GAP SERPL CALC-SCNC: 11 MMOL/L — SIGNIFICANT CHANGE UP (ref 7–14)
AST SERPL-CCNC: 27 U/L — SIGNIFICANT CHANGE UP (ref 0–41)
BASOPHILS # BLD AUTO: 0.04 K/UL — SIGNIFICANT CHANGE UP (ref 0–0.2)
BASOPHILS NFR BLD AUTO: 0.7 % — SIGNIFICANT CHANGE UP (ref 0–1)
BILIRUB SERPL-MCNC: 0.8 MG/DL — SIGNIFICANT CHANGE UP (ref 0.2–1.2)
BUN SERPL-MCNC: 18 MG/DL — SIGNIFICANT CHANGE UP (ref 10–20)
CALCIUM SERPL-MCNC: 9.2 MG/DL — SIGNIFICANT CHANGE UP (ref 8.4–10.5)
CHLORIDE SERPL-SCNC: 102 MMOL/L — SIGNIFICANT CHANGE UP (ref 98–110)
CO2 SERPL-SCNC: 24 MMOL/L — SIGNIFICANT CHANGE UP (ref 17–32)
CREAT SERPL-MCNC: 1.4 MG/DL — SIGNIFICANT CHANGE UP (ref 0.7–1.5)
EGFR: 57 ML/MIN/1.73M2 — LOW
EOSINOPHIL # BLD AUTO: 0.06 K/UL — SIGNIFICANT CHANGE UP (ref 0–0.7)
EOSINOPHIL NFR BLD AUTO: 1.1 % — SIGNIFICANT CHANGE UP (ref 0–8)
FLUAV AG NPH QL: DETECTED
FLUBV AG NPH QL: SIGNIFICANT CHANGE UP
GLUCOSE SERPL-MCNC: 97 MG/DL — SIGNIFICANT CHANGE UP (ref 70–99)
HCT VFR BLD CALC: 42.2 % — SIGNIFICANT CHANGE UP (ref 42–52)
HGB BLD-MCNC: 14.7 G/DL — SIGNIFICANT CHANGE UP (ref 14–18)
IMM GRANULOCYTES NFR BLD AUTO: 0.4 % — HIGH (ref 0.1–0.3)
LIDOCAIN IGE QN: 30 U/L — SIGNIFICANT CHANGE UP (ref 7–60)
LYMPHOCYTES # BLD AUTO: 0.37 K/UL — LOW (ref 1.2–3.4)
LYMPHOCYTES # BLD AUTO: 6.7 % — LOW (ref 20.5–51.1)
MCHC RBC-ENTMCNC: 30.8 PG — SIGNIFICANT CHANGE UP (ref 27–31)
MCHC RBC-ENTMCNC: 34.8 G/DL — SIGNIFICANT CHANGE UP (ref 32–37)
MCV RBC AUTO: 88.3 FL — SIGNIFICANT CHANGE UP (ref 80–94)
MONOCYTES # BLD AUTO: 0.61 K/UL — HIGH (ref 0.1–0.6)
MONOCYTES NFR BLD AUTO: 11 % — HIGH (ref 1.7–9.3)
NEUTROPHILS # BLD AUTO: 4.44 K/UL — SIGNIFICANT CHANGE UP (ref 1.4–6.5)
NEUTROPHILS NFR BLD AUTO: 80.1 % — HIGH (ref 42.2–75.2)
NRBC # BLD: 0 /100 WBCS — SIGNIFICANT CHANGE UP (ref 0–0)
PLATELET # BLD AUTO: 166 K/UL — SIGNIFICANT CHANGE UP (ref 130–400)
PMV BLD: 9.9 FL — SIGNIFICANT CHANGE UP (ref 7.4–10.4)
POTASSIUM SERPL-MCNC: 4.3 MMOL/L — SIGNIFICANT CHANGE UP (ref 3.5–5)
POTASSIUM SERPL-SCNC: 4.3 MMOL/L — SIGNIFICANT CHANGE UP (ref 3.5–5)
PROT SERPL-MCNC: 7 G/DL — SIGNIFICANT CHANGE UP (ref 6–8)
RBC # BLD: 4.78 M/UL — SIGNIFICANT CHANGE UP (ref 4.7–6.1)
RBC # FLD: 12.6 % — SIGNIFICANT CHANGE UP (ref 11.5–14.5)
RSV RNA NPH QL NAA+NON-PROBE: SIGNIFICANT CHANGE UP
SARS-COV-2 RNA SPEC QL NAA+PROBE: SIGNIFICANT CHANGE UP
SODIUM SERPL-SCNC: 137 MMOL/L — SIGNIFICANT CHANGE UP (ref 135–146)
WBC # BLD: 5.54 K/UL — SIGNIFICANT CHANGE UP (ref 4.8–10.8)
WBC # FLD AUTO: 5.54 K/UL — SIGNIFICANT CHANGE UP (ref 4.8–10.8)

## 2025-01-09 PROCEDURE — 0241U: CPT

## 2025-01-09 PROCEDURE — 83690 ASSAY OF LIPASE: CPT

## 2025-01-09 PROCEDURE — 80053 COMPREHEN METABOLIC PANEL: CPT

## 2025-01-09 PROCEDURE — 96374 THER/PROPH/DIAG INJ IV PUSH: CPT

## 2025-01-09 PROCEDURE — 71046 X-RAY EXAM CHEST 2 VIEWS: CPT | Mod: 26

## 2025-01-09 PROCEDURE — 71046 X-RAY EXAM CHEST 2 VIEWS: CPT

## 2025-01-09 PROCEDURE — 99284 EMERGENCY DEPT VISIT MOD MDM: CPT | Mod: 25

## 2025-01-09 PROCEDURE — 85025 COMPLETE CBC W/AUTO DIFF WBC: CPT

## 2025-01-09 PROCEDURE — 99284 EMERGENCY DEPT VISIT MOD MDM: CPT

## 2025-01-09 PROCEDURE — 96375 TX/PRO/DX INJ NEW DRUG ADDON: CPT

## 2025-01-09 RX ORDER — KETOROLAC TROMETHAMINE 30 MG/ML
15 INJECTION INTRAMUSCULAR; INTRAVENOUS ONCE
Refills: 0 | Status: DISCONTINUED | OUTPATIENT
Start: 2025-01-09 | End: 2025-01-09

## 2025-01-09 RX ORDER — METOCLOPRAMIDE 10 MG/1
10 TABLET ORAL ONCE
Refills: 0 | Status: COMPLETED | OUTPATIENT
Start: 2025-01-09 | End: 2025-01-09

## 2025-01-09 RX ORDER — SODIUM CHLORIDE 9 MG/ML
2000 INJECTION, SOLUTION INTRAVENOUS ONCE
Refills: 0 | Status: COMPLETED | OUTPATIENT
Start: 2025-01-09 | End: 2025-01-09

## 2025-01-09 RX ADMIN — KETOROLAC TROMETHAMINE 15 MILLIGRAM(S): 30 INJECTION INTRAMUSCULAR; INTRAVENOUS at 09:29

## 2025-01-09 RX ADMIN — SODIUM CHLORIDE 2000 MILLILITER(S): 9 INJECTION, SOLUTION INTRAVENOUS at 09:29

## 2025-01-09 RX ADMIN — METOCLOPRAMIDE 10 MILLIGRAM(S): 10 TABLET ORAL at 09:29

## 2025-01-09 NOTE — ED PROVIDER NOTE - CCCP TRG CHIEF CMPLNT
Size Of Lesion In Cm (Optional): 0 Procedure To Be Performed At Next Visit: Mohs surgery Detail Level: Detailed Introduction Text (Please End With A Colon): The following procedure was deferred: fever

## 2025-01-09 NOTE — ED PROVIDER NOTE - CLINICAL SUMMARY MEDICAL DECISION MAKING FREE TEXT BOX
Agree with above history and exam.  Patient here with URI-like symptoms well-appearing x-ray unremarkable.

## 2025-01-09 NOTE — ED PROVIDER NOTE - OBJECTIVE STATEMENT
Patient is a 59y male pmhx htn hld etoh abuse in the past GERD p/w fever cough congestion body aches x2 days. Otherwise denies any headache, changes in vision, cp, palpitations, sob, n/v/d, abd pain, constipation, urinary complaints, lower extremity pain/swelling.

## 2025-01-09 NOTE — ED PROVIDER NOTE - NSICDXFAMILYHX_GEN_ALL_CORE_FT
BP trending low, Dr. Logan aware. Pt has telemetry order but telemetry is not currently found on patient. Clarified order with Dr. Logan, pt needs telemetry. Called monitor tech, no telemetry boxes available at this time. Monitor tech called supervisor to see if there are pts who can be removed from tele. Awaiting response from Laura.    FAMILY HISTORY:  Father  Still living? No  FH: alcoholism, Age at diagnosis: Age Unknown    Mother  Still living? No  FH: breast cancer, Age at diagnosis: Age Unknown

## 2025-01-09 NOTE — ED PROVIDER NOTE - PROGRESS NOTE DETAILS
pk: all results d/w pt & copies given, strict return precautions discussed, rec outpt f.u with pcp. Patient agreeable with plan and demonstrates understanding. after dc called pt aware of flu result

## 2025-01-09 NOTE — ED PROVIDER NOTE - PATIENT PORTAL LINK FT
You can access the FollowMyHealth Patient Portal offered by [facility] by registering at the following website: [email or web address]. By joining [employer]’s FollowMyHealth portal, you will also be able to view your health information using other applications (apps) compatible with our system.

## 2025-04-22 NOTE — ED PROVIDER NOTE - NSDCPRINTRESULTS_ED_ALL_ED
New referral placed for sleep medicine to Dr. Garcia.  Awaiting authorization and signature.   Patient requests all Lab, Cardiology, and Radiology Results on their Discharge Instructions

## 2025-07-28 ENCOUNTER — APPOINTMENT (OUTPATIENT)
Dept: CARDIOLOGY | Facility: CLINIC | Age: 60
End: 2025-07-28